# Patient Record
Sex: MALE | Race: WHITE | NOT HISPANIC OR LATINO | URBAN - METROPOLITAN AREA
[De-identification: names, ages, dates, MRNs, and addresses within clinical notes are randomized per-mention and may not be internally consistent; named-entity substitution may affect disease eponyms.]

---

## 2017-03-06 ENCOUNTER — AMBULATORY - HEALTHEAST (OUTPATIENT)
Dept: CARDIOLOGY | Facility: CLINIC | Age: 52
End: 2017-03-06

## 2017-03-06 DIAGNOSIS — I25.10 CORONARY ARTERY DISEASE: ICD-10-CM

## 2017-03-06 ASSESSMENT — MIFFLIN-ST. JEOR: SCORE: 1693.09

## 2017-03-14 ENCOUNTER — AMBULATORY - HEALTHEAST (OUTPATIENT)
Dept: CARDIOLOGY | Facility: CLINIC | Age: 52
End: 2017-03-14

## 2017-03-15 ENCOUNTER — COMMUNICATION - HEALTHEAST (OUTPATIENT)
Dept: CARDIOLOGY | Facility: CLINIC | Age: 52
End: 2017-03-15

## 2017-03-15 DIAGNOSIS — E78.5 DYSLIPIDEMIA: ICD-10-CM

## 2017-03-31 ENCOUNTER — AMBULATORY - HEALTHEAST (OUTPATIENT)
Dept: CARDIOLOGY | Facility: CLINIC | Age: 52
End: 2017-03-31

## 2017-05-15 ENCOUNTER — COMMUNICATION - HEALTHEAST (OUTPATIENT)
Dept: CARDIOLOGY | Facility: CLINIC | Age: 52
End: 2017-05-15

## 2017-05-22 ENCOUNTER — AMBULATORY - HEALTHEAST (OUTPATIENT)
Dept: CARDIOLOGY | Facility: CLINIC | Age: 52
End: 2017-05-22

## 2017-05-22 DIAGNOSIS — I25.10 CAD (CORONARY ARTERY DISEASE): ICD-10-CM

## 2017-06-02 ENCOUNTER — AMBULATORY - HEALTHEAST (OUTPATIENT)
Dept: CARDIOLOGY | Facility: CLINIC | Age: 52
End: 2017-06-02

## 2017-07-10 ENCOUNTER — COMMUNICATION - HEALTHEAST (OUTPATIENT)
Dept: CARDIOLOGY | Facility: CLINIC | Age: 52
End: 2017-07-10

## 2017-07-10 DIAGNOSIS — I25.10 CAD (CORONARY ARTERY DISEASE): ICD-10-CM

## 2017-08-14 ENCOUNTER — AMBULATORY - HEALTHEAST (OUTPATIENT)
Dept: CARDIOLOGY | Facility: CLINIC | Age: 52
End: 2017-08-14

## 2017-08-14 DIAGNOSIS — E78.5 DYSLIPIDEMIA: ICD-10-CM

## 2017-08-14 ASSESSMENT — MIFFLIN-ST. JEOR: SCORE: 1647.73

## 2017-08-17 ENCOUNTER — AMBULATORY - HEALTHEAST (OUTPATIENT)
Dept: CARDIOLOGY | Facility: CLINIC | Age: 52
End: 2017-08-17

## 2017-08-21 ENCOUNTER — AMBULATORY - HEALTHEAST (OUTPATIENT)
Dept: CARDIOLOGY | Facility: CLINIC | Age: 52
End: 2017-08-21

## 2017-10-22 ENCOUNTER — COMMUNICATION - HEALTHEAST (OUTPATIENT)
Dept: CARDIOLOGY | Facility: CLINIC | Age: 52
End: 2017-10-22

## 2017-10-22 DIAGNOSIS — E78.5 DYSLIPIDEMIA: ICD-10-CM

## 2017-10-22 DIAGNOSIS — I25.10 CAD (CORONARY ARTERY DISEASE): ICD-10-CM

## 2017-10-23 ENCOUNTER — COMMUNICATION - HEALTHEAST (OUTPATIENT)
Dept: CARDIOLOGY | Facility: CLINIC | Age: 52
End: 2017-10-23

## 2017-10-23 DIAGNOSIS — I25.10 CAD (CORONARY ARTERY DISEASE): ICD-10-CM

## 2017-10-23 DIAGNOSIS — E78.5 HYPERLIPIDEMIA: ICD-10-CM

## 2017-11-13 ENCOUNTER — AMBULATORY - HEALTHEAST (OUTPATIENT)
Dept: CARDIOLOGY | Facility: CLINIC | Age: 52
End: 2017-11-13

## 2017-11-13 DIAGNOSIS — I25.10 CORONARY ARTERY DISEASE DUE TO CALCIFIED CORONARY LESION: ICD-10-CM

## 2017-11-13 DIAGNOSIS — I25.84 CORONARY ARTERY DISEASE DUE TO CALCIFIED CORONARY LESION: ICD-10-CM

## 2017-12-05 ENCOUNTER — OFFICE VISIT - HEALTHEAST (OUTPATIENT)
Dept: CARDIOLOGY | Facility: CLINIC | Age: 52
End: 2017-12-05

## 2017-12-05 DIAGNOSIS — I10 ESSENTIAL HYPERTENSION: ICD-10-CM

## 2017-12-05 DIAGNOSIS — I25.10 CORONARY ARTERY DISEASE DUE TO CALCIFIED CORONARY LESION: ICD-10-CM

## 2017-12-05 DIAGNOSIS — E78.00 PURE HYPERCHOLESTEROLEMIA: ICD-10-CM

## 2017-12-05 DIAGNOSIS — Z95.1 S/P CABG (CORONARY ARTERY BYPASS GRAFT): ICD-10-CM

## 2017-12-05 DIAGNOSIS — I25.84 CORONARY ARTERY DISEASE DUE TO CALCIFIED CORONARY LESION: ICD-10-CM

## 2017-12-05 ASSESSMENT — MIFFLIN-ST. JEOR: SCORE: 1704.43

## 2017-12-26 ENCOUNTER — COMMUNICATION - HEALTHEAST (OUTPATIENT)
Dept: CARDIOLOGY | Facility: CLINIC | Age: 52
End: 2017-12-26

## 2017-12-26 DIAGNOSIS — E78.5 HYPERLIPIDEMIA: ICD-10-CM

## 2018-01-26 ENCOUNTER — COMMUNICATION - HEALTHEAST (OUTPATIENT)
Dept: CARDIOLOGY | Facility: CLINIC | Age: 53
End: 2018-01-26

## 2018-01-26 DIAGNOSIS — I25.10 CAD (CORONARY ARTERY DISEASE): ICD-10-CM

## 2018-02-05 ENCOUNTER — AMBULATORY - HEALTHEAST (OUTPATIENT)
Dept: CARDIOLOGY | Facility: CLINIC | Age: 53
End: 2018-02-05

## 2018-02-05 DIAGNOSIS — I25.84 CORONARY ARTERY DISEASE DUE TO CALCIFIED CORONARY LESION: ICD-10-CM

## 2018-02-05 DIAGNOSIS — I25.10 CORONARY ARTERY DISEASE DUE TO CALCIFIED CORONARY LESION: ICD-10-CM

## 2018-02-05 ASSESSMENT — MIFFLIN-ST. JEOR: SCORE: 1727.11

## 2018-02-08 ENCOUNTER — AMBULATORY - HEALTHEAST (OUTPATIENT)
Dept: CARDIOLOGY | Facility: CLINIC | Age: 53
End: 2018-02-08

## 2018-02-14 ENCOUNTER — COMMUNICATION - HEALTHEAST (OUTPATIENT)
Dept: CARDIOLOGY | Facility: CLINIC | Age: 53
End: 2018-02-14

## 2018-04-23 ENCOUNTER — AMBULATORY - HEALTHEAST (OUTPATIENT)
Dept: CARDIOLOGY | Facility: CLINIC | Age: 53
End: 2018-04-23

## 2018-04-23 DIAGNOSIS — I25.84 CORONARY ARTERY DISEASE DUE TO CALCIFIED CORONARY LESION: ICD-10-CM

## 2018-04-23 DIAGNOSIS — I25.10 CORONARY ARTERY DISEASE DUE TO CALCIFIED CORONARY LESION: ICD-10-CM

## 2018-04-25 ENCOUNTER — COMMUNICATION - HEALTHEAST (OUTPATIENT)
Dept: CARDIOLOGY | Facility: CLINIC | Age: 53
End: 2018-04-25

## 2018-04-25 DIAGNOSIS — I25.10 CAD (CORONARY ARTERY DISEASE): ICD-10-CM

## 2018-07-17 ENCOUNTER — AMBULATORY - HEALTHEAST (OUTPATIENT)
Dept: CARDIOLOGY | Facility: CLINIC | Age: 53
End: 2018-07-17

## 2018-07-17 DIAGNOSIS — I25.10 CAD (CORONARY ARTERY DISEASE): ICD-10-CM

## 2018-07-17 ASSESSMENT — MIFFLIN-ST. JEOR: SCORE: 1790.61

## 2018-07-27 ENCOUNTER — COMMUNICATION - HEALTHEAST (OUTPATIENT)
Dept: CARDIOLOGY | Facility: CLINIC | Age: 53
End: 2018-07-27

## 2018-07-27 DIAGNOSIS — I25.10 CAD (CORONARY ARTERY DISEASE): ICD-10-CM

## 2018-08-01 ENCOUNTER — AMBULATORY - HEALTHEAST (OUTPATIENT)
Dept: CARDIOLOGY | Facility: CLINIC | Age: 53
End: 2018-08-01

## 2018-10-09 ENCOUNTER — AMBULATORY - HEALTHEAST (OUTPATIENT)
Dept: CARDIOLOGY | Facility: CLINIC | Age: 53
End: 2018-10-09

## 2018-10-09 DIAGNOSIS — I25.10 CAD (CORONARY ARTERY DISEASE): ICD-10-CM

## 2018-10-11 ENCOUNTER — COMMUNICATION - HEALTHEAST (OUTPATIENT)
Dept: CARDIOLOGY | Facility: CLINIC | Age: 53
End: 2018-10-11

## 2018-10-11 DIAGNOSIS — E78.5 HYPERLIPIDEMIA: ICD-10-CM

## 2019-01-08 ENCOUNTER — AMBULATORY - HEALTHEAST (OUTPATIENT)
Dept: CARDIOLOGY | Facility: CLINIC | Age: 54
End: 2019-01-08

## 2019-01-08 DIAGNOSIS — I25.10 CAD (CORONARY ARTERY DISEASE): ICD-10-CM

## 2019-01-08 ASSESSMENT — MIFFLIN-ST. JEOR: SCORE: 1727.11

## 2019-01-15 ENCOUNTER — AMBULATORY - HEALTHEAST (OUTPATIENT)
Dept: CARDIOLOGY | Facility: CLINIC | Age: 54
End: 2019-01-15

## 2019-02-10 ENCOUNTER — COMMUNICATION - HEALTHEAST (OUTPATIENT)
Dept: CARDIOLOGY | Facility: CLINIC | Age: 54
End: 2019-02-10

## 2019-02-10 DIAGNOSIS — I25.10 CAD (CORONARY ARTERY DISEASE): ICD-10-CM

## 2019-03-06 ENCOUNTER — OFFICE VISIT - HEALTHEAST (OUTPATIENT)
Dept: CARDIOLOGY | Facility: CLINIC | Age: 54
End: 2019-03-06

## 2019-03-06 DIAGNOSIS — E78.5 HYPERLIPIDEMIA: ICD-10-CM

## 2019-03-06 DIAGNOSIS — Z95.1 S/P CABG (CORONARY ARTERY BYPASS GRAFT): ICD-10-CM

## 2019-03-06 DIAGNOSIS — E78.00 PURE HYPERCHOLESTEROLEMIA: ICD-10-CM

## 2019-03-06 DIAGNOSIS — I25.10 CAD (CORONARY ARTERY DISEASE): ICD-10-CM

## 2019-03-06 DIAGNOSIS — I10 ESSENTIAL HYPERTENSION: ICD-10-CM

## 2019-03-06 RX ORDER — NITROGLYCERIN 0.4 MG/1
0.4 TABLET SUBLINGUAL EVERY 5 MIN PRN
Qty: 20 TABLET | Refills: 4 | Status: SHIPPED | OUTPATIENT
Start: 2019-03-06

## 2019-03-06 ASSESSMENT — MIFFLIN-ST. JEOR: SCORE: 1713.5

## 2019-04-02 ENCOUNTER — AMBULATORY - HEALTHEAST (OUTPATIENT)
Dept: CARDIOLOGY | Facility: CLINIC | Age: 54
End: 2019-04-02

## 2019-04-02 DIAGNOSIS — I25.10 CAD (CORONARY ARTERY DISEASE): ICD-10-CM

## 2019-06-25 ENCOUNTER — AMBULATORY - HEALTHEAST (OUTPATIENT)
Dept: CARDIOLOGY | Facility: CLINIC | Age: 54
End: 2019-06-25

## 2019-06-25 DIAGNOSIS — I25.10 CAD (CORONARY ARTERY DISEASE): ICD-10-CM

## 2019-06-25 ASSESSMENT — MIFFLIN-ST. JEOR: SCORE: 1681.75

## 2019-07-01 ENCOUNTER — AMBULATORY - HEALTHEAST (OUTPATIENT)
Dept: CARDIOLOGY | Facility: CLINIC | Age: 54
End: 2019-07-01

## 2019-09-17 ENCOUNTER — AMBULATORY - HEALTHEAST (OUTPATIENT)
Dept: CARDIOLOGY | Facility: CLINIC | Age: 54
End: 2019-09-17

## 2019-09-17 DIAGNOSIS — I25.10 CAD (CORONARY ARTERY DISEASE): ICD-10-CM

## 2019-11-02 ENCOUNTER — COMMUNICATION - HEALTHEAST (OUTPATIENT)
Dept: CARDIOLOGY | Facility: CLINIC | Age: 54
End: 2019-11-02

## 2019-11-02 DIAGNOSIS — E78.5 HYPERLIPIDEMIA: ICD-10-CM

## 2019-12-10 ENCOUNTER — AMBULATORY - HEALTHEAST (OUTPATIENT)
Dept: CARDIOLOGY | Facility: CLINIC | Age: 54
End: 2019-12-10

## 2019-12-10 DIAGNOSIS — E78.5 HYPERLIPIDEMIA LDL GOAL <70: ICD-10-CM

## 2019-12-10 ASSESSMENT — MIFFLIN-ST. JEOR: SCORE: 1686.29

## 2019-12-20 ENCOUNTER — AMBULATORY - HEALTHEAST (OUTPATIENT)
Dept: CARDIOLOGY | Facility: CLINIC | Age: 54
End: 2019-12-20

## 2020-03-03 ENCOUNTER — AMBULATORY - HEALTHEAST (OUTPATIENT)
Dept: CARDIOLOGY | Facility: CLINIC | Age: 55
End: 2020-03-03

## 2020-03-03 DIAGNOSIS — E78.5 HYPERLIPIDEMIA LDL GOAL <70: ICD-10-CM

## 2020-03-03 DIAGNOSIS — Z00.6 CLINICAL TRIAL PARTICIPANT: ICD-10-CM

## 2020-03-13 ENCOUNTER — COMMUNICATION - HEALTHEAST (OUTPATIENT)
Dept: CARDIOLOGY | Facility: CLINIC | Age: 55
End: 2020-03-13

## 2020-03-13 DIAGNOSIS — I25.10 CAD (CORONARY ARTERY DISEASE): ICD-10-CM

## 2020-04-09 ENCOUNTER — COMMUNICATION - HEALTHEAST (OUTPATIENT)
Dept: CARDIOLOGY | Facility: CLINIC | Age: 55
End: 2020-04-09

## 2020-04-09 DIAGNOSIS — I25.10 CAD (CORONARY ARTERY DISEASE): ICD-10-CM

## 2020-04-23 ENCOUNTER — OFFICE VISIT - HEALTHEAST (OUTPATIENT)
Dept: CARDIOLOGY | Facility: CLINIC | Age: 55
End: 2020-04-23

## 2020-04-23 DIAGNOSIS — I25.84 CORONARY ARTERY DISEASE DUE TO CALCIFIED CORONARY LESION: ICD-10-CM

## 2020-04-23 DIAGNOSIS — E78.5 DYSLIPIDEMIA, GOAL LDL BELOW 70: ICD-10-CM

## 2020-04-23 DIAGNOSIS — I10 ESSENTIAL HYPERTENSION: ICD-10-CM

## 2020-04-23 DIAGNOSIS — Z95.1 S/P CABG (CORONARY ARTERY BYPASS GRAFT): ICD-10-CM

## 2020-04-23 DIAGNOSIS — E78.00 PURE HYPERCHOLESTEROLEMIA: ICD-10-CM

## 2020-04-23 DIAGNOSIS — I25.10 CORONARY ARTERY DISEASE DUE TO CALCIFIED CORONARY LESION: ICD-10-CM

## 2020-05-26 ENCOUNTER — COMMUNICATION - HEALTHEAST (OUTPATIENT)
Dept: CARDIOLOGY | Facility: CLINIC | Age: 55
End: 2020-05-26

## 2020-05-27 ENCOUNTER — OFFICE VISIT - HEALTHEAST (OUTPATIENT)
Dept: CARDIOLOGY | Facility: CLINIC | Age: 55
End: 2020-05-27

## 2020-05-27 ENCOUNTER — AMBULATORY - HEALTHEAST (OUTPATIENT)
Dept: CARDIOLOGY | Facility: CLINIC | Age: 55
End: 2020-05-27

## 2020-05-27 DIAGNOSIS — I25.10 CAD (CORONARY ARTERY DISEASE): ICD-10-CM

## 2020-06-11 ENCOUNTER — COMMUNICATION - HEALTHEAST (OUTPATIENT)
Dept: CARDIOLOGY | Facility: CLINIC | Age: 55
End: 2020-06-11

## 2020-06-11 DIAGNOSIS — I25.10 CAD (CORONARY ARTERY DISEASE): ICD-10-CM

## 2020-07-11 ENCOUNTER — COMMUNICATION - HEALTHEAST (OUTPATIENT)
Dept: CARDIOLOGY | Facility: CLINIC | Age: 55
End: 2020-07-11

## 2020-07-11 DIAGNOSIS — I25.10 CAD (CORONARY ARTERY DISEASE): ICD-10-CM

## 2020-08-16 ENCOUNTER — COMMUNICATION - HEALTHEAST (OUTPATIENT)
Dept: CARDIOLOGY | Facility: CLINIC | Age: 55
End: 2020-08-16

## 2020-08-16 DIAGNOSIS — I25.10 CAD (CORONARY ARTERY DISEASE): ICD-10-CM

## 2020-08-18 ENCOUNTER — OFFICE VISIT - HEALTHEAST (OUTPATIENT)
Dept: CARDIOLOGY | Facility: CLINIC | Age: 55
End: 2020-08-18

## 2020-08-18 DIAGNOSIS — I25.10 CAD (CORONARY ARTERY DISEASE): ICD-10-CM

## 2020-09-09 ENCOUNTER — COMMUNICATION - HEALTHEAST (OUTPATIENT)
Dept: CARDIOLOGY | Facility: CLINIC | Age: 55
End: 2020-09-09

## 2020-09-09 DIAGNOSIS — E78.5 HYPERLIPIDEMIA: ICD-10-CM

## 2020-11-09 ENCOUNTER — COMMUNICATION - HEALTHEAST (OUTPATIENT)
Dept: CARDIOLOGY | Facility: CLINIC | Age: 55
End: 2020-11-09

## 2020-11-10 ENCOUNTER — AMBULATORY - HEALTHEAST (OUTPATIENT)
Dept: CARDIOLOGY | Facility: CLINIC | Age: 55
End: 2020-11-10

## 2020-11-10 DIAGNOSIS — I25.10 CAD (CORONARY ARTERY DISEASE): ICD-10-CM

## 2020-11-10 ASSESSMENT — MIFFLIN-ST. JEOR: SCORE: 1676.76

## 2020-12-02 ENCOUNTER — AMBULATORY - HEALTHEAST (OUTPATIENT)
Dept: CARDIOLOGY | Facility: CLINIC | Age: 55
End: 2020-12-02

## 2020-12-19 ENCOUNTER — COMMUNICATION - HEALTHEAST (OUTPATIENT)
Dept: CARDIOLOGY | Facility: CLINIC | Age: 55
End: 2020-12-19

## 2020-12-19 DIAGNOSIS — I25.10 CAD (CORONARY ARTERY DISEASE): ICD-10-CM

## 2021-02-01 ENCOUNTER — COMMUNICATION - HEALTHEAST (OUTPATIENT)
Dept: CARDIOLOGY | Facility: CLINIC | Age: 56
End: 2021-02-01

## 2021-02-02 ENCOUNTER — AMBULATORY - HEALTHEAST (OUTPATIENT)
Dept: CARDIOLOGY | Facility: CLINIC | Age: 56
End: 2021-02-02

## 2021-02-02 DIAGNOSIS — I25.10 CAD (CORONARY ARTERY DISEASE): ICD-10-CM

## 2021-04-05 ENCOUNTER — COMMUNICATION - HEALTHEAST (OUTPATIENT)
Dept: CARDIOLOGY | Facility: CLINIC | Age: 56
End: 2021-04-05

## 2021-04-05 DIAGNOSIS — I25.10 CAD (CORONARY ARTERY DISEASE): ICD-10-CM

## 2021-04-05 RX ORDER — CLOPIDOGREL BISULFATE 75 MG/1
TABLET ORAL
Qty: 90 TABLET | Refills: 0 | Status: SHIPPED | OUTPATIENT
Start: 2021-04-05 | End: 2021-07-28

## 2021-04-07 ENCOUNTER — COMMUNICATION - HEALTHEAST (OUTPATIENT)
Dept: ADMINISTRATIVE | Facility: CLINIC | Age: 56
End: 2021-04-07

## 2021-04-27 ENCOUNTER — COMMUNICATION - HEALTHEAST (OUTPATIENT)
Dept: CARDIOLOGY | Facility: CLINIC | Age: 56
End: 2021-04-27

## 2021-04-28 ENCOUNTER — AMBULATORY - HEALTHEAST (OUTPATIENT)
Dept: CARDIOLOGY | Facility: CLINIC | Age: 56
End: 2021-04-28

## 2021-04-28 DIAGNOSIS — I25.10 CAD (CORONARY ARTERY DISEASE): ICD-10-CM

## 2021-05-29 NOTE — PATIENT INSTRUCTIONS - HE
Thank you for meeting with the research team today regarding the Fourier OLE study.  We will see you in about 3 months back at [facility] to resupply you with study drug.   If you have any questions in the meantime, please contact the research team.    Research Hotline: [phone]    Next drug resupply only visit: Tuesday Sept 10, at 730AM    Next in clinic FASTING labs visit: Tuesday Dec 3, at 730AM    Dinesh Lira RN  Clinical Trials Nurse

## 2021-05-30 VITALS — WEIGHT: 197 LBS | BODY MASS INDEX: 29.86 KG/M2 | HEIGHT: 68 IN

## 2021-05-31 VITALS — HEIGHT: 68 IN | WEIGHT: 187 LBS | BODY MASS INDEX: 28.34 KG/M2

## 2021-05-31 VITALS — BODY MASS INDEX: 29.03 KG/M2 | WEIGHT: 196 LBS | HEIGHT: 69 IN

## 2021-06-01 ENCOUNTER — RECORDS - HEALTHEAST (OUTPATIENT)
Dept: ADMINISTRATIVE | Facility: CLINIC | Age: 56
End: 2021-06-01

## 2021-06-01 VITALS — HEIGHT: 69 IN | WEIGHT: 201 LBS | BODY MASS INDEX: 29.77 KG/M2

## 2021-06-01 VITALS — HEIGHT: 69 IN | WEIGHT: 215 LBS | BODY MASS INDEX: 31.84 KG/M2

## 2021-06-02 ENCOUNTER — OFFICE VISIT - HEALTHEAST (OUTPATIENT)
Dept: CARDIOLOGY | Facility: CLINIC | Age: 56
End: 2021-06-02

## 2021-06-02 VITALS — WEIGHT: 198 LBS | BODY MASS INDEX: 29.33 KG/M2 | HEIGHT: 69 IN

## 2021-06-02 VITALS — HEIGHT: 69 IN | BODY MASS INDEX: 29.77 KG/M2 | WEIGHT: 201 LBS

## 2021-06-02 DIAGNOSIS — I10 ESSENTIAL HYPERTENSION: ICD-10-CM

## 2021-06-02 DIAGNOSIS — I25.10 CORONARY ARTERY DISEASE DUE TO CALCIFIED CORONARY LESION: ICD-10-CM

## 2021-06-02 DIAGNOSIS — E78.00 PURE HYPERCHOLESTEROLEMIA: ICD-10-CM

## 2021-06-02 DIAGNOSIS — Z95.1 S/P CABG (CORONARY ARTERY BYPASS GRAFT): ICD-10-CM

## 2021-06-02 DIAGNOSIS — I25.84 CORONARY ARTERY DISEASE DUE TO CALCIFIED CORONARY LESION: ICD-10-CM

## 2021-06-02 DIAGNOSIS — Z00.6 EXAMINATION OF PARTICIPANT IN CLINICAL TRIAL: ICD-10-CM

## 2021-06-03 VITALS — WEIGHT: 191 LBS | HEIGHT: 69 IN | BODY MASS INDEX: 28.29 KG/M2

## 2021-06-04 VITALS
HEART RATE: 54 BPM | BODY MASS INDEX: 27.32 KG/M2 | WEIGHT: 185 LBS | DIASTOLIC BLOOD PRESSURE: 76 MMHG | SYSTOLIC BLOOD PRESSURE: 140 MMHG

## 2021-06-04 VITALS
HEART RATE: 56 BPM | WEIGHT: 192 LBS | DIASTOLIC BLOOD PRESSURE: 72 MMHG | HEIGHT: 69 IN | SYSTOLIC BLOOD PRESSURE: 120 MMHG | BODY MASS INDEX: 28.44 KG/M2 | RESPIRATION RATE: 14 BRPM

## 2021-06-04 NOTE — PATIENT INSTRUCTIONS - HE
Thank you for meeting with the research team today regarding the Fourier OLE study.  We will see you in about 3 months back at [facility] to resupply you with study drug.   If you have any questions in the meantime, please contact the research team.    Research Hotline: [phone]    Next drug resupply only visit: Tuesday 3/3/20 at 7:30am in the [facility] in clinic FASTING labs visit:  Wednesday 5/27/20 at 7:30am  12/23  1/6  1/20  2/3  2/17  3/2    3/16  3/30  4/13  4/27  5/11  5/25    Dinesh Lira RN  Clinical Trials Nurse

## 2021-06-05 VITALS
HEIGHT: 69 IN | SYSTOLIC BLOOD PRESSURE: 120 MMHG | RESPIRATION RATE: 20 BRPM | BODY MASS INDEX: 28.13 KG/M2 | WEIGHT: 189.9 LBS | DIASTOLIC BLOOD PRESSURE: 60 MMHG | HEART RATE: 56 BPM

## 2021-06-06 NOTE — PATIENT INSTRUCTIONS - HE
Thank you for coming in today.  I will ask Dinesh or Mona to get in touch with you for the next study visit. This visit will be a fasting visit and is expected to be around May 21, 2020.  Have a wonderful spring.    Best regards,  Jenna Matamoros RN, BSN  Clinical Trials Nurse  [phone]

## 2021-06-07 NOTE — PATIENT INSTRUCTIONS - HE
Mr Espinoza,  Certainly was nice to video chat with you today.  I am glad to hear you are doing well.  As discussed, we will arrange for a stress test once virus issues settle down.  In the interim, call me with any issues otherwise I will see you in a year or sooner if needed.  Sony Irizarry

## 2021-06-08 ENCOUNTER — RECORDS - HEALTHEAST (OUTPATIENT)
Dept: ADMINISTRATIVE | Facility: OTHER | Age: 56
End: 2021-06-08

## 2021-06-08 DIAGNOSIS — E78.5 HYPERLIPIDEMIA: ICD-10-CM

## 2021-06-08 DIAGNOSIS — I25.10 CAD (CORONARY ARTERY DISEASE): ICD-10-CM

## 2021-06-08 RX ORDER — AMLODIPINE BESYLATE 2.5 MG/1
TABLET ORAL
Qty: 90 TABLET | Refills: 1 | Status: SHIPPED | OUTPATIENT
Start: 2021-06-08 | End: 2022-01-25

## 2021-06-08 RX ORDER — ATORVASTATIN CALCIUM 10 MG/1
TABLET, FILM COATED ORAL
Qty: 45 TABLET | Refills: 1 | Status: SHIPPED | OUTPATIENT
Start: 2021-06-08 | End: 2021-08-18

## 2021-06-09 ENCOUNTER — RECORDS - HEALTHEAST (OUTPATIENT)
Dept: ADMINISTRATIVE | Facility: CLINIC | Age: 56
End: 2021-06-09

## 2021-06-09 NOTE — PROGRESS NOTES
"FOURIER OLE study    Subject seen in clinic for Week 24 study visit.    Visit Vitals     /60 (Patient Site: Right Arm, Patient Position: Sitting, Cuff Size: Adult Large)     Pulse (!) 56     Temp 97.7  F (36.5  C) (Oral)     Resp 16     Ht 5' 8\" (1.727 m)     Wt 197 lb (89.4 kg)     BMI 29.95 kg/m2     Labs drawn per protocol.     Subject returned Used pens & boxes   Used pens #3  [id number]   Used pens #2   [id number]  Administrations This Visit     Study Drug evolocumab 140 mg/mL injector pen 1 Syringe ()     Admin Date Action Dose Route Administered By             03/06/2017 Given 1 Syringe Subcutaneous Dinesh Lira RN                      Subject self injected 1 pen of study drug  box [id number] at 09:20am to right thigh. Pen out of refrigerator at 08:50am.    Sent home with new IP   1 box 2 pens of box [id number]    1 box 3 pens of box [id number] (addended [id number])    Subject reports no adverse events,endpoints, or significant changes in health.  Lipid lab values are now unblinded and will be listed in 'media'.  Will see subject back in 6 months for week 48 visit.       Current Outpatient Prescriptions:      amLODIPine (NORVASC) 2.5 MG tablet, TAKE ONE TABLET BY MOUTH AT BEDTIME, Disp: 90 tablet, Rfl: 2     aspirin 81 MG EC tablet, Take 81 mg by mouth every evening. , Disp: , Rfl:      atorvastatin (LIPITOR) 20 MG tablet, Take 1 tablet (20 mg total) by mouth bedtime., Disp: 90 tablet, Rfl: 3     clopidogrel (PLAVIX) 75 mg tablet, TAKE ONE TABLET BY MOUTH ONE TIME DAILY, Disp: 90 tablet, Rfl: 1     diazepam (VALIUM) 2 MG tablet, Take 2 mg by mouth daily as needed for anxiety. , Disp: , Rfl:      isosorbide mononitrate (IMDUR) 30 MG 24 hr tablet, TAKE ONE TABLET BY MOUTH ONE TIME DAILY, Disp: 90 tablet, Rfl: 2     metoprolol succinate (TOPROL-XL) 25 MG, TAKE ONE TABLET BY MOUTH AT BEDTIME, Disp: 90 tablet, Rfl: 2     Study Drug evolocumab 140 mg/mL (), Inject 1 Syringe under the skin " every 14 (fourteen) days., Disp: , Rfl:     Current Facility-Administered Medications:      Study Drug evolocumab 140 mg/mL injector pen 1 Syringe (), 1 Syringe, Subcutaneous, Q14 Days, Rita Irizarry MD, 1 Syringe at 12/12/16 0818     Study Drug evolocumab 140 mg/mL injector pen 1 Syringe (), 1 Syringe, Subcutaneous, Q14 Days, Rita Irizarry MD, 1 Syringe at 03/06/17 0920     Study Drug Evolocumab 140 mg/Placebo Autoinjector pen <FOURIER> 1 pen, 1 pen, Subcutaneous, Q14 Days, Jenna Matamoros RN, 1 pen at 09/15/16 0925      Dinesh Lira RN  Clinical Trials Nurse

## 2021-06-10 NOTE — PROGRESS NOTES
FOURIER-OLE STUDY:  A Multicenter, Open-label, Single-arm, Extension Study to Assess  Long-term Safety of Evolocumab Therapy in Patients With Clinically Evident  Cardiovascular Disease    Subject seen for Week 36 Study Drug Resupply    There were no assessments or other activities performed as this visit was only to return study drug pens & boxes and resupply.  Will see subject in 3 months for next study visit in clinic.     Subject returned Used pens & boxes   Used pens #2 [id number]   Used pens #3 [id number]    Sent home with new IP   1 box 3 pens of box [id number]   1 box 3 pens of box [id number]     Dinesh Lira RN  Clinical Trials Nurse

## 2021-06-10 NOTE — TELEPHONE ENCOUNTER
COVID-19 Wellness Screening Tool   LVM with wellness information and reminder for tomorrow's resupply.  Dinesh Lira RN

## 2021-06-11 NOTE — PROGRESS NOTES
FOURIER OLE study    Monday 5/15/2017 (previous phone contact with patient)  Called patient to discuss Fourier OLE study visits and his ER visit yesterday.  Patient reported gradual increasing abdominal pain yesterday that caused him to go into [facility] ER.  Physician reported assessment and CT scan at  didn't show any acute processes - Hepatic steatosis, Sigmoid diverticulosis.  His pain eventually subsided without treatment and he was discharged without issue. Final Impression: Abdominal pain and Nausea     Dinesh Lira RN  Clinical Trials Nurse    Adverse Event: Nausea (abdominal pain event was previously reported and documented)  Serious: No  Severity CTCAE  1   Start Date: 14May2017  End Date: 14May2017  Dr. Irizarry: Please assign causality of above AE  Relationship: Is there a reasonable possibility that the event may have been caused by Investigational Medicinal Product? NO  Relationship: Is there a reasonable possibility that the event may have been caused by a statin? NO  Relationship: Is there a reasonable possibility that the event may have been caused by a non-statin lipid regulating medication? NO  Relationship: Is there a reasonable possibility that the event may have been caused by the investigational device? NO

## 2021-06-12 NOTE — TELEPHONE ENCOUNTER
Spoke with Aníbal about the study plan for tomorrow and conducting covid19 prescreening information.  He relayed he can't cover nose and mouth at same time which we will work around.  Dinesh Lira RN

## 2021-06-13 NOTE — PATIENT INSTRUCTIONS - HE
Thank you for meeting with the research team today regarding the Fourier OLE study.  We will see you in about 3 months to resupply you with study drug.   If you have any questions in the meantime, please contact the research team.    Research Hotline: [phone]    Dinesh Lira RN  Clinical Trials Nurse

## 2021-06-14 NOTE — TELEPHONE ENCOUNTER
Spoke with subject for study visit drug resupply reminder - denied covid19 symptoms, exposure, testing.  Discussed covid19 prescreen and study plan for tomorrow at [facility].    Dinesh Lira RN

## 2021-06-14 NOTE — PROGRESS NOTES
[facility] Follow-up Note    Assessment & Plan        1. Coronary artery disease due to calcified coronary lesion  -angiography January 2013 showed 20% mid left main lesion, proximal total occlusion of the left anterior descending, circumflex with a 60% mid lesion followed by a total occlusion, and the right coronary artery with a mid 60% followed by distal 70% lesion.  He received drug-coated stents to the right coronary artery.  We'll keep him on chronic Plavix therapy.  He can discontinue his aspirin.     2. S/P CABG (coronary artery bypass graft)  -patient had bypass in July 2009 with a LIMA to the LAD, vein graft and OM 3, and both of these are patent.    Given progression of disease despite bypass he is on chronic Plavix therapy.   3. Essential hypertension -under good control on amlodipine which is also antispasmodic.   4. Pure hypercholesterolemia -cholesterol 63 with an LDL of 13, given this we will decrease Lipitor to 5 mg on his next renewal.  He is in the open label phase of the [employer] study.     Plan  1.  When he calls and runs out of his atorvastatin 20 mg were renewed 10 mg tablets.  He will then cut these in half or 5 mg.  2.  Follow-up with me in about 18 months or sooner if needed.  3.  Try to wean and taper Imdur.    Subjective  CC: 52-year-old white gentleman being seen in yearly follow-up today.  Since I seen him he was hospitalized at [facility] secondary to some chest discomfort lightheadedness.  This was over the summer.  He is getting along well in the interim without any chest discomfort, palpitations, shortness breath, PND, orthopnea or peripheral edema.    Medications  Current Outpatient Prescriptions   Medication Sig     amLODIPine (NORVASC) 2.5 MG tablet Take 1 tablet (2.5 mg total) by mouth at bedtime.     atorvastatin (LIPITOR) 20 MG tablet Take 0.5 tablets (10 mg total) by mouth at bedtime.     clopidogrel (PLAVIX) 75 mg tablet Take 1 tablet (75 mg total) by  "mouth daily.     diazePAM (VALIUM) 2 MG tablet Take 1 tablet (2 mg total) by mouth daily as needed for anxiety.     isosorbide mononitrate (IMDUR) 30 MG 24 hr tablet Take 30 mg by mouth daily.        Objective  /78 (Patient Site: Left Arm, Patient Position: Sitting, Cuff Size: Adult Regular)  Pulse (!) 52  Resp 16  Ht 5' 9\" (1.753 m)  Wt 196 lb (88.9 kg)  BMI 28.94 kg/m2    General Appearance:    Alert, cooperative, no distress, appears stated age   Head:    Normocephalic, without obvious abnormality, atraumatic   Throat:   Lips, mucosa, and tongue normal; teeth and gums normal   Neck:   Supple, symmetrical, trachea midline, no adenopathy;        thyroid:  No enlargement/tenderness/nodules; no carotid    bruit or JVD   Back:     Symmetric, no curvature, ROM normal, no CVA tenderness   Lungs:     Clear to auscultation bilaterally, respirations unlabored   Chest wall:    No tenderness, midline sternotomy scar   Heart:    Regular rate and rhythm, S1 and S2 normal, no murmur, rub   or gallop   Abdomen:     Soft, non-tender, bowel sounds active all four quadrants,     no masses, no organomegaly   Extremities:   Normal, atraumatic, no cyanosis or edema   Pulses:   2+ and symmetric all extremities   Skin:   Skin color, texture, turgor normal, no rashes or lesions     Results    Lab Results personally reviewed   Lab Results   Component Value Date    CHOL 144 01/17/2014    CHOL 167 07/08/2009     Lab Results   Component Value Date    HDL 29 (L) 01/17/2014    HDL 24 (L) 07/08/2009     Lab Results   Component Value Date    LDLCALC 95 01/17/2014    LDLCALC 108 07/08/2009     Lab Results   Component Value Date    TRIG 105 01/17/2014    TRIG 175 (H) 07/08/2009     Lab Results   Component Value Date    WBC 8.1 08/18/2017    HGB 16.1 08/18/2017    HCT 43.7 08/18/2017     08/18/2017     Lab Results   Component Value Date    CREATININE 0.92 08/18/2017    BUN 14 08/18/2017     08/18/2017    K 3.8 08/18/2017    " CO2 21 (L) 08/18/2017     Review of Systems:   General: WNL  Eyes: WNL  Ears/Nose/Throat: WNL  Lungs: WNL  Heart: WNL  Stomach: WNL  Bladder: WNL  Muscle/Joints: WNL  Skin: WNL  Nervous System: WNL  Mental Health: WNL     Blood: WNL

## 2021-06-16 PROBLEM — E78.00 PURE HYPERCHOLESTEROLEMIA: Status: ACTIVE | Noted: 2017-12-05

## 2021-06-21 NOTE — LETTER
Letter by Rita Irizarry MD at      Author: Rita Irizarry MD Service: -- Author Type: --    Filed:  Encounter Date: 4/7/2021 Status: (Other)         Aníbal Espinoza  [address] [address]      April 7, 2021      Dear Aníbal,    This letter is to remind you that you will be due for your follow up appointment with Dr. Sony Irizarry in April, 2021 . To help ensure you are in the best health possible, a regular follow-up with your cardiologist is essential.     Please call our Patient Scheduling Line at [phone] to schedule your appointment at your earliest convenience.  If you have recently scheduled an appointment, please disregard this letter.    We look forward to seeing you again. As always, we are available at the number  above for any questions or concerns you may have.      Sincerely,     The Physicians and Staff of [facility]

## 2021-06-22 NOTE — PATIENT INSTRUCTIONS - HE
Thank you for meeting with the research team today regarding the Fourier OLE study.  We will see you in about 3 months back at [facility] to resupply you with study drug.   If you have any questions in the meantime, please contact the research team.      Tuesday April 2nd at 7:30AM for a drug resupply in the [facility] area  Tuesday June 25th at 7:30AM for a fasting     Research Hotline: [phone]    Dinesh Lira RN  Clinical Trials Nurse

## 2021-06-24 NOTE — PATIENT INSTRUCTIONS - HE
Mr Aníbal Espinoza,  I enjoyed visiting with you again today.  I am glad to hear you are doing well.  Per our conversation all the medications were renewed.  I will plan on seeing you 1 year or sooner if needed.  Sony Irizarry

## 2021-06-24 NOTE — PROGRESS NOTES
[facility] [place] [facility] Follow-up Note    Assessment & Plan        1. S/P CABG (coronary artery bypass graft)  -patient had bypass in July 2009 with a LIMA to the LAD, vein graft and OM 3, and both of these are patent.    Given progression of disease despite bypass he is on chronic Plavix therapy.   2. Hyperlipidemia -on Repatha as part of the open label extension in the study, as well as atorvastatin 5 mg every other day.  Total cholesterol 79 with an LDL of 25 and per study protocol continue current medications.   3. CAD (coronary artery disease) -angiography January 2013 showed 20% mid left main lesion, proximal total occlusion of the LAD, circumflex with a 60% mid lesion followed by total occlusion, and right coronary artery with a mid 60% followed by distal 70% lesion and he received drug-coated stents to the right coronary artery.  As above chronic Plavix therapy, preventive therapy, and he is asymptomatic.   4. Essential hypertension -under good control currently.     Plan  1.  Renewed medications today.  2.  Follow-up with me in 1 year or sooner if needed.    Subjective  CC: 53-year-old white gentleman here for yearly follow-up today.  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  He does get an occasional discomfort in the middle of his back.    Medications  Current Outpatient Medications   Medication Sig     amLODIPine (NORVASC) 2.5 MG tablet Take 1 tablet (2.5 mg total) by mouth daily.     atorvastatin (LIPITOR) 10 MG tablet Take 0.5 tablets (5 mg total) by mouth every other day.     clopidogrel (PLAVIX) 75 mg tablet Take 1 tablet (75 mg total) by mouth daily.     nitroglycerin (NITROSTAT) 0.4 MG SL tablet Place 1 tablet (0.4 mg total) under the tongue every 5 (five) minutes as needed for chest pain.       Objective  /62 (Patient Site: Left Arm, Patient Position: Sitting, Cuff Size:  "Adult Large)   Pulse (!) 46   Resp 16   Ht 5' 9\" (1.753 m)   Wt 198 lb (89.8 kg)   BMI 29.24 kg/m      General Appearance:    Alert, cooperative, no distress, appears stated age   Head:    Normocephalic, without obvious abnormality, atraumatic   Throat:   Lips, mucosa, and tongue normal; teeth and gums normal   Neck:   Supple, symmetrical, trachea midline, no adenopathy;        thyroid:  No enlargement/tenderness/nodules; no carotid    bruit or JVD   Back:     Symmetric, no curvature, ROM normal, no CVA tenderness   Lungs:     Clear to auscultation bilaterally, respirations unlabored   Chest wall:    No tenderness, midline sternotomy scar   Heart:    Regular rate and rhythm, S1 and S2 normal, no murmur, rub   or gallop   Abdomen:     Soft, non-tender, bowel sounds active all four quadrants,     no masses, no organomegaly   Extremities:   Normal, atraumatic, no cyanosis or edema   Pulses:   2+ and symmetric all extremities   Skin:   Skin color, texture, turgor normal, no rashes or lesions     Results    Lab Results personally reviewed   Lab Results   Component Value Date    CHOL 144 01/17/2014    CHOL 167 07/08/2009     Lab Results   Component Value Date    HDL 29 (L) 01/17/2014    HDL 24 (L) 07/08/2009     Lab Results   Component Value Date    LDLCALC 95 01/17/2014    LDLCALC 108 07/08/2009     Lab Results   Component Value Date    TRIG 105 01/17/2014    TRIG 175 (H) 07/08/2009     Lab Results   Component Value Date    WBC 8.1 08/18/2017    HGB 16.1 08/18/2017    HCT 43.7 08/18/2017     08/18/2017     Lab Results   Component Value Date    CREATININE 0.92 08/18/2017    BUN 14 08/18/2017     08/18/2017    K 3.8 08/18/2017    CO2 21 (L) 08/18/2017     Review of Systems:   General: WNL  Eyes: WNL  Ears/Nose/Throat: WNL  Lungs: WNL  Heart: WNL  Stomach: WNL  Bladder: WNL  Muscle/Joints: WNL  Skin: WNL  Nervous System: WNL  Mental Health: WNL     Blood: WNL        "

## 2021-06-25 NOTE — PROGRESS NOTES
Progress Notes by Dinesh Lira RN at 8/21/2017  3:24 PM     Author: Dinesh Lira RN Service: -- Author Type: Registered Nurse    Filed: 8/21/2017  4:22 PM Encounter Date: 8/21/2017 Status: Signed    : Rita Irizarry MD (Physician)    Related Notes: Original Note by Dinesh Lira RN (Registered Nurse) filed at 8/21/2017  3:59 PM            Further Cardiovascular Outcomes Research With PCSK9 Inhibition in Subjects With  Elevated Risk Open-label Extension: FOURIER OLE study        SERIOUS ADVERSE EVENT: Subject experienced chest pain and light headedness Friday morning and went to [facility] ER for evaluation. [facility] was [facility] that morning for observation overnight.  No troponin elevations,  EKG showed sinus fatuma with no significant changes, and Echo showed 55-60% ef. Cardiologist Dr. Mcnally attributed his symptoms to Bradycardia -  his Metoprolol 25mg QD was held and his symptoms improved.  Subject took his biweekly study medication today Monday 21Aug2017 as scheduled.  Previously dose was Monday 07Aug2017.     Adverse Event: Intermittent Bradycardia as manifested by chest pain and light headedness               Serious: Yes  Severity CTCAE: 3   Start Date: 18Aug2017  End Date: 19Aug2017  Dr. Irizarry: Please assign causality of above EHSAN  Relationship: Is there a reasonable possibility that the event may have been caused by Investigational Medicinal Product?     NO  Relationship: Is there a reasonable possibility that the event may have been caused by a statin?      NO  Relationship: Is there a reasonable possibility that the event may have been caused by a non-statin lipid regulating medication?     NO  Relationship: Is there a reasonable possibility that the event may have been caused by the investigational device?      NO  Relationship: Is there a reasonable possibility that the event may have been caused by study procedure/activity?    NO

## 2021-06-25 NOTE — PROGRESS NOTES
Progress Notes by Dinesh Lira, RN at 11/13/2017  7:30 AM     Author: Dinesh Lira, RN Service: -- Author Type: Registered Nurse    Filed: 11/13/2017 10:19 AM Encounter Date: 11/13/2017 Status: Signed    : Dinesh Lira RN (Registered Nurse)         FOURIER-OLE STUDY:  A Multicenter, Open-label, Single-arm, Extension Study to Assess  Long-term Safety of Evolocumab Therapy in Patients With Clinically Evident  Cardiovascular Disease    Subject seen for Week 60 Study Drug Resupply    There were no assessments or other activities performed as this visit was only to return study drug pens & boxes and resupply.  Will see subject in 3 months for next study visit in clinic.     Subject returned Used pens & boxes   Used pens #3 [id number]   Used pens #3 [id number]    Sent home with new IP   1 box 3 pens of box [id number]   1 box 3 pens of box [id number]     Dinesh Lira RN  Clinical Trials Nurse

## 2021-06-26 NOTE — PROGRESS NOTES
Progress Notes by Dinesh Lira RN at 10/9/2018  9:30 AM     Author: Dinesh Lira RN Service: -- Author Type: Registered Nurse    Filed: 10/9/2018  9:51 AM Encounter Date: 10/9/2018 Status: Signed    : Dinesh Lira RN (Registered Nurse)         FOURIER-OLE STUDY:  A Multicenter, Open-label, Single-arm, Extension Study to Assess  Long-term Safety of Evolocumab Therapy in Patients With Clinically Evident  Cardiovascular Disease    Subject seen for Week 84 Study Drug Resupply    There were no assessments or other activities performed as this visit was only to return study drug pens & boxes and resupply.  Will see subject in 3 months for next study visit in clinic.     Subject returned Used pens & boxes. Subject reports no issues with pens or administration.   Used pens #3 [id number]   Used pens #3 [id number]     Sent home with new IP   1 box 3 pens of box [id number]   1 box 3 pens of box [id number]      Dinesh Lira RN  Clinical Trials Nurse

## 2021-06-26 NOTE — PROGRESS NOTES
Progress Notes by Dinesh Lira RN at 4/23/2018  7:30 AM     Author: Dinesh Lira RN Service: -- Author Type: Registered Nurse    Filed: 4/23/2018 10:00 AM Encounter Date: 4/23/2018 Status: Signed    : Dinesh Lira RN (Registered Nurse)         FOURIER-OLE STUDY:  A Multicenter, Open-label, Single-arm, Extension Study to Assess  Long-term Safety of Evolocumab Therapy in Patients With Clinically Evident  Cardiovascular Disease    Subject seen for Week 84 Study Drug Resupply    There were no assessments or other activities performed as this visit was only to return study drug pens & boxes and resupply.  Will see subject in 3 months for next study visit in clinic.     Subject returned Used pens & boxes. Subject reports no issues with pens or administration.   Used pens #2 [id number]   Used pens #3 [id number]    Sent home with new IP   1 box 3 pens of box [id number]   1 box 3 pens of box [id number]        Dinesh Lira RN  Clinical Trials Nurse

## 2021-06-27 NOTE — PROGRESS NOTES
Progress Notes by Dinesh Lira RN at 4/2/2019  7:30 AM     Author: Dinesh Lira, RN Service: -- Author Type: Registered Nurse    Filed: 4/2/2019  7:56 AM Encounter Date: 4/2/2019 Status: Signed    : Dinesh Lira RN (Registered Nurse)         FOURIER-OLE STUDY:  A Multicenter, Open-label, Single-arm, Extension Study to Assess  Long-term Safety of Evolocumab Therapy in Patients With Clinically Evident  Cardiovascular Disease    Subject seen for Week 132 Study Drug Resupply    There were no assessments or other activities performed as this visit was only to return study drug pens & boxes and resupply.  Will see subject in 3 months for next study visit in clinic.     Subject returned Used pens & boxes. Subject reports no issues with pens or administration.   Used pens #3  [id number]   Used pens #3 [id number]        Sent home with new IP   1 box 3 pens of box [id number]   1 box 3 pens of box [id number]        Dinesh Lira RN  Clinical Trials Nurse

## 2021-06-28 NOTE — PROGRESS NOTES
Progress Notes by Jenna Matamoros RN at 3/3/2020  7:30 AM     Author: Jenna Matamoros RN Service: -- Author Type: Registered Nurse    Filed: 3/3/2020  8:07 AM Encounter Date: 3/3/2020 Status: Signed    : Jenna Matamoros RN (Registered Nurse)           Subject dispensed with    [id number] -3 pens   [id number] -3 pens    he returned:    [id number]-box & 3 used pens   [id number]-box & 3 used pens    Will call to schedule Week 198 visit (due ~05.21.2020).    Jenna Matamoros RN, BSN  Clinical Trials Nurse

## 2021-06-28 NOTE — PROGRESS NOTES
Progress Notes by Dinesh Lira RN at 9/17/2019  7:30 AM     Author: Dinesh Lira RN Service: -- Author Type: Registered Nurse    Filed: 9/17/2019  9:40 AM Encounter Date: 9/17/2019 Status: Signed    : Dinesh Lira RN (Registered Nurse)         FOURIER-OLE STUDY:  A Multicenter, Open-label, Single-arm, Extension Study to Assess  Long-term Safety of Evolocumab Therapy in Patients With Clinically Evident  Cardiovascular Disease    Subject seen for Week 156 Study Drug Resupply    There were no assessments or other activities performed as this visit was only to return study drug pens & boxes and resupply.  Will see subject in 3 months for next study visit in clinic.     Subject returned Used pens & boxes. Subject reports no issues with pens or administration.   Used pens #3 of [id number]   Used pens #3 of [id number]    Sent home with new IP   1 box 3 pens of box [id number]   1 box 3 pens of box [id number]     Dinesh Lira RN  Clinical Trials Nurse

## 2021-06-29 NOTE — PROGRESS NOTES
"Progress Notes by Rita Irizarry MD at 4/23/2020  9:50 AM     Author: Rita Irizarry MD Service: -- Author Type: Physician    Filed: 4/23/2020 10:09 AM Encounter Date: 4/23/2020 Status: Signed    : Rita Irizarry MD (Physician)           The patient has been notified of following:     \"This video visit will be conducted via a call between you and your physician/provider. We have found that certain health care needs can be provided without the need for an in-person physical exam.  This service lets us provide the care you need with a video conversation.  If a prescription is necessary we can send it directly to your pharmacy.  If lab work is needed we can place an order for that and you can then stop by our lab to have the test done at a later time.      Patient has given verbal consent to a Video visit? Yes    HEART CARE VIDEO ENCOUNTER        The patient has chosen to have the visit conducted as a video visit, to reduce risk of exposure given the current status of Coronavirus in our community. This video visit is being conducted via a call between the patient and physician/provider. Health care needs are being provided without a physical exam.     Assessment/Recommendations   Assessment/Plan:  1. Coronary artery disease due to calcified coronary lesion --angiography August 2015 showed 20% mid left main lesion, proximal total occlusion of the LAD, circumflex with mid lesion total occlusion, and right coronary artery with drug-coated stents to the right coronary artery.  Chronic Plavix therapy, preventive therapy, and he is asymptomatic.  If he has increased symptoms discussion was about  procedure to the circumflex.   2. S/P CABG (coronary artery bypass graft) --patient had bypass in July 2009 with a LIMA to the LAD, vein graft and OM 3, and both of these are patent.    Given progression of disease despite bypass he is on chronic Plavix therapy.  Given that his bypass was over 10 years ago will " arrange for stress testing every other year.   3. Pure hypercholesterolemia -on PCSK9 inhibitor possibly with an minimal atorvastatin.   4. Essential hypertension -47096 and need to monitor.       Plan  1.  Arrange for exercise stress test.  Follow Up Plan: 12 months  I have reviewed the note as documented.  This accurately captures the substance of my conversation with the patient.    Total time of video between patient and provider was 10 minutes   Start Time:0950   Stop Time:1000    Originating Location (pt. Location): Home    Distant Location (provider location):  [facility]      Mode of Communication:  Video Conference via [email or web address]       History of Present Illness/Subjective    Aníbal Espinoza is a 54 y.o. male who is being evaluated via a billable video visit and has consented to a video visit. Aníbal Espinoza has a history of coronary artery disease and bypass surgery for yearly follow-up.  He is not working right now due to virus pandemic. Patient complains of no syncope, dizziness, fatigue, fevers, chest pain, palpitations, shortness of breath, PND, orthopnea, nausea, vomiting, or edema.      I have reviewed and updated the patient's Past Medical History, Social History, Family History and Medication List.     Physical Examination performed via live video encounter Review of Systems   General Appearance:   no distress, normal body habitus, upright.   ENT/Mouth: membranes moist, no nasal discharge or bleeding gums.  Normal head shape, no evidence of injury or laceration.     EYES:  no scleral icterus, normal conjunctivae   Neck: no evidence of thyromegaly.  Supple   Chest/Lungs:   No audible wheezing equal chest wall expansion. Non labored breathing.  No cough.   Cardiovascular:   No evidence of elevated jugular venous pressure.  No evidence of pitting edema bilaterally    Abdomen:  no evidence of abdominal distention. No observe juandice.     Extremities: no cyanosis or clubbing noted.    Skin: no  xanthelasma, normal skin collar. No evidence of facial lacerations.      Neurologic: Normal arm motion bilateral, no tremors.  No evidence of focal defect.       Psychiatric: alert and oriented x3, calm     Review of systems are within normal limits                                         Medical History  Surgical History Family History Social History   Past Medical History:   Diagnosis Date   ? Anxiety disorder    ? Coronary artery disease due to calcified coronary lesion 07/07/2009    also noted 8/22/2015   ? Dyslipidemia, goal LDL below 70 07/07/2009    diagnosed when presented for MI   ? Essential hypertension 07/07/2009   ? History of MI (myocardial infarction) 07/07/2009   ? Panic disorder     Past Surgical History:   Procedure Laterality Date   ? CARDIAC CATHETERIZATION  8/31/15   ? CORONARY ARTERY BYPASS GRAFT  07/10/2009    CAB X2 with LIMA to LAD and SVG to OM by Dr. Duncan at [facility]   ? CORONARY STENT PLACEMENT  01/17/2014    3 ENID to mid to distal RCA by Dr. Mcqueen; SVG to OM was occluded but LIMA to LAD had collaterals to OM    Family History   Problem Relation Age of Onset   ? Heart attack Mother    ? Stroke Mother 81      Social History     Socioeconomic History   ? Marital status: Life Partner     Spouse name: Jamar   ? Number of children: 0   ? Years of education: Not on file   ? Highest education level: Not on file   Occupational History   ? Occupation:      Employer: SELF EMPLOYED   Social Needs   ? Financial resource strain: Not on file   ? Food insecurity     Worry: Not on file     Inability: Not on file   ? Transportation needs     Medical: Not on file     Non-medical: Not on file   Tobacco Use   ? Smoking status: Former Smoker     Packs/day: 1.00     Years: 35.00     Pack years: 35.00     Types: Cigarettes     Last attempt to quit: 7/8/2009     Years since quitting: 10.8   ? Smokeless tobacco: Never Used   Substance and Sexual Activity   ? Alcohol use: No   ? Drug use: No    ? Sexual activity: Yes     Partners: Male   Lifestyle   ? Physical activity     Days per week: Not on file     Minutes per session: Not on file   ? Stress: Not on file   Relationships   ? Social connections     Talks on phone: Not on file     Gets together: Not on file     Attends Confucianism service: Not on file     Active member of club or organization: Not on file     Attends meetings of clubs or organizations: Not on file     Relationship status: Not on file   ? Intimate partner violence     Fear of current or ex partner: Not on file     Emotionally abused: Not on file     Physically abused: Not on file     Forced sexual activity: Not on file   Other Topics Concern   ? Not on file   Social History Narrative   ? Not on file          Medications  Allergies   Current Outpatient Medications   Medication Sig Dispense Refill   ? amLODIPine (NORVASC) 2.5 MG tablet TAKE 1 TABLET BY MOUTH EVERY DAY 90 tablet 0   ? atorvastatin (LIPITOR) 10 MG tablet TAKE 1/2 TABLET BY MOUTH NIGHTLY AT BEDTIME (Patient taking differently: Take 5 mg by mouth every other day. Take 0.5 tablets (5mg total) by mouth every other day) 45 tablet 1   ? cholecalciferol, vitamin D3, 5,000 unit Tab Take 125 mcg by mouth.     ? clopidogreL (PLAVIX) 75 mg tablet TAKE 1 TABLET BY MOUTH EVERY DAY 90 tablet 0   ? nitroglycerin (NITROSTAT) 0.4 MG SL tablet Place 1 tablet (0.4 mg total) under the tongue every 5 (five) minutes as needed for chest pain. 20 tablet 4     Current Facility-Administered Medications   Medication Dose Route Frequency Provider Last Rate Last Dose   ? Study Drug evolocumab 140 mg/mL injector pen 1 Syringe ()  1 Syringe Subcutaneous Q14 Days Jenna Matamoros RN        No Known Allergies      Lab Results    Chemistry/lipid CBC Cardiac Enzymes/BNP/TSH/INR   Lab Results   Component Value Date    CHOL 144 01/17/2014    HDL 29 (L) 01/17/2014    LDLCALC 95 01/17/2014    TRIG 105 01/17/2014    CREATININE 0.92 08/18/2017    BUN 14  08/18/2017    K 3.8 08/18/2017     08/18/2017     (H) 08/18/2017    CO2 21 (L) 08/18/2017    Lab Results   Component Value Date    WBC 8.1 08/18/2017    HGB 16.1 08/18/2017    HCT 43.7 08/18/2017    MCV 94 08/18/2017     08/18/2017    Lab Results   Component Value Date    CKMB 39 (HH) 07/11/2009    TROPONINI <0.01 08/18/2017     (H) 07/08/2009    INR 1.09 08/18/2017        Rita Irizarry

## 2021-06-29 NOTE — PROGRESS NOTES
Progress Notes by Dinesh Lira, RN at 8/18/2020  8:00 AM     Author: Dinesh Lira, RN Service: -- Author Type: Registered Nurse    Filed: 8/18/2020  9:11 AM Encounter Date: 8/18/2020 Status: Signed    : Dinesh Lira RN (Registered Nurse)         FOURIER-OLE STUDY:  A Multicenter, Open-label, Single-arm, Extension Study to Assess  Long-term Safety of Evolocumab Therapy in Patients With Clinically Evident  Cardiovascular Disease    Subject seen for Week 204 Study Drug Resupply    There were no assessments or other activities performed as this visit was only to return study drug pens & boxes and resupply.  Will see subject in 3 months for next study visit in clinic.  Covid19 precautions and screening taken.     Subject returned Used pens & boxes. Subject reports no issues with pens or administration.   Used pens #3 of [id number]   Used pens #3 of [id number]      Sent home with new IP   1 box 3 pens of box [id number]    1 box 3 pens of box [id number]    Dinesh Lira RN  Clinical Trials Nurse

## 2021-06-30 NOTE — PROGRESS NOTES
Progress Notes by Dinesh Lira, RN at 2/2/2021  9:00 AM     Author: Dinesh Lira, RN Service: -- Author Type: Registered Nurse    Filed: 2/2/2021 10:09 AM Encounter Date: 2/2/2021 Status: Signed    : Dinesh Lira RN (Registered Nurse)         FOURIER-OLE STUDY:  A Multicenter, Open-label, Single-arm, Extension Study to Assess  Long-term Safety of Evolocumab Therapy in Patients With Clinically Evident  Cardiovascular Disease    Subject seen for Week 228 Study Drug Resupply    There were no assessments or other activities performed as this visit was only to return study drug pens & boxes and resupply.  Will see subject in 3 months for next study visit in clinic.  Covid19 precautions and screening taken.     Subject returned Used pens & boxes. Subject reports no issues with pens or administration.   Used pens #3 of [id number]   Used pens #3 of [id number]    Sent home with new IP   1 box 3 pens of box [id number]   1 box 3 pens of box [id number]     Dinesh Lira RN  Clinical Trials Nurse

## 2021-07-04 NOTE — PROGRESS NOTES
"Progress Notes by Rita Irizarry MD at 6/2/2021  2:50 PM     Author: Rita Irizarry MD Service: -- Author Type: Physician    Filed: 6/2/2021  3:37 PM Encounter Date: 6/2/2021 Status: Signed    : Rita Irizarry MD (Physician)           The patient has been notified of following:     \"This video visit will be conducted via a call between you and your physician/provider. We have found that certain health care needs can be provided without the need for an in-person physical exam.  This service lets us provide the care you need with a video conversation.  If a prescription is necessary we can send it directly to your pharmacy.  If lab work is needed we can place an order for that and you can then stop by our lab to have the test done at a later time.      Patient has given verbal consent to a Video visit? Yes    HEART CARE VIDEO ENCOUNTER        The patient has chosen to have the visit conducted as a video visit, to reduce risk of exposure given the current status of Coronavirus in our community. This video visit is being conducted via a call between the patient and physician/provider. Health care needs are being provided without a physical exam.     Assessment/Recommendations   Assessment/Plan:  1. S/P CABG (coronary artery bypass graft) --patient had bypass in July 2009 with a LIMA to the LAD, vein graft and OM 3, and both of these are patent.    Given progression of disease despite bypass he is on chronic Plavix therapy.  Given that his bypass was over 10 years ago will arrange for stress testing every other year, however he would like to wait till the mask mandate is off and once mask mandate is released we will then bring him in for stress test.   2. Coronary artery disease due to calcified coronary lesion -angiography August 2015 showed 20% mid left main lesion, proximal total occlusion of the LAD, circumflex with mid lesion total occlusion, and right coronary artery with drug-coated stents to the " right coronary artery.  Chronic Plavix therapy, preventive therapy, and he is asymptomatic.  If he has increased symptoms discussion was about  procedure to the circumflex.   3. Pure hypercholesterolemia-he is in the open label extension of the Fourier trial and his total cholesterol is 74 with an LDL of 25.  We will consider him for Olpe study.   4. Essential hypertension-excellent control with 130/63 at home.   5. Examination of participant in clinical trial-as above in the open label extension.       Plan  1.  Once mask mandate release, consider stress test.  2.  Consider for enrollment in the Claudy study.  Follow Up Plan:  12 months  I have reviewed the note as documented.  This accurately captures the substance of my conversation with the patient.    Total time of video between patient and provider was 10 minutes   Start Time: 1525  Stop Time: 1535    Originating Location (pt. Location): Home    Distant Location (provider location):  [facility]     Mode of Communication:  Video Conference via [email or web address]       History of Present Illness/Subjective    Aníbal Espinoza is a 55 y.o. male who is being evaluated via a billable video visit and has consented to a video visit. Aníbal Espinoza has a history of coronary artery disease and bypass surgery for yearly follow-up.  Due to the fact that he does not want to wear a mask in public this was a video conference.  He is still working, still does not have health insurance, his partner does not have health insurance either.  He is functioning well. Patient complains of no syncope, dizziness, fatigue, fevers, chest pain, palpitations, shortness of breath, PND, orthopnea, nausea, vomiting, or edema.      I have reviewed and updated the patient's Past Medical History, Social History, Family History and Medication List.     Physical Examination performed via live video encounter Review of Systems   General Appearance:   no distress, normal body  habitus, upright.   ENT/Mouth: membranes moist, no nasal discharge or bleeding gums.  Normal head shape, no evidence of injury or laceration.     EYES:  no scleral icterus, normal conjunctivae   Neck: no evidence of thyromegaly.  Supple   Chest/Lungs:   No audible wheezing equal chest wall expansion. Non labored breathing.  No cough.   Cardiovascular:   No evidence of elevated jugular venous pressure.  No evidence of pitting edema bilaterally    Abdomen:  no evidence of abdominal distention. No observe juandice.     Extremities: no cyanosis or clubbing noted.    Skin: no xanthelasma, normal skin collar. No evidence of facial lacerations.      Neurologic: Normal arm motion bilateral, no tremors.  No evidence of focal defect.       Psychiatric: alert and oriented x3, calm     Review of systems are within normal limits                                         Medical History  Surgical History Family History Social History   Past Medical History:   Diagnosis Date   ? Anxiety disorder    ? Coronary artery disease due to calcified coronary lesion 07/07/2009    also noted 8/22/2015   ? Dyslipidemia, goal LDL below 70 07/07/2009    diagnosed when presented for MI   ? Essential hypertension 07/07/2009   ? History of MI (myocardial infarction) 07/07/2009   ? Panic disorder     Past Surgical History:   Procedure Laterality Date   ? CARDIAC CATHETERIZATION  8/31/15   ? CORONARY ARTERY BYPASS GRAFT  07/10/2009    CAB X2 with LIMA to LAD and SVG to OM by Dr. Duncan at [facility]   ? CORONARY STENT PLACEMENT  01/17/2014    3 ENID to mid to distal RCA by Dr. Mcqueen; SVG to OM was occluded but LIMA to LAD had collaterals to OM    Family History   Problem Relation Age of Onset   ? Heart attack Mother    ? Stroke Mother 81      Social History     Socioeconomic History   ? Marital status: Life Partner     Spouse name: Jamar   ? Number of children: 0   ? Years of education: Not on file   ? Highest education level: Not on file    Occupational History   ? Occupation:      Employer: SELF EMPLOYED   Social Needs   ? Financial resource strain: Not on file   ? Food insecurity     Worry: Not on file     Inability: Not on file   ? Transportation needs     Medical: Not on file     Non-medical: Not on file   Tobacco Use   ? Smoking status: Former Smoker     Packs/day: 1.00     Years: 35.00     Pack years: 35.00     Types: Cigarettes     Quit date: 2009     Years since quittin.9   ? Smokeless tobacco: Never Used   Substance and Sexual Activity   ? Alcohol use: No   ? Drug use: No   ? Sexual activity: Yes     Partners: Male   Lifestyle   ? Physical activity     Days per week: Not on file     Minutes per session: Not on file   ? Stress: Not on file   Relationships   ? Social connections     Talks on phone: Not on file     Gets together: Not on file     Attends Adventism service: Not on file     Active member of club or organization: Not on file     Attends meetings of clubs or organizations: Not on file     Relationship status: Not on file   ? Intimate partner violence     Fear of current or ex partner: Not on file     Emotionally abused: Not on file     Physically abused: Not on file     Forced sexual activity: Not on file   Other Topics Concern   ? Not on file   Social History Narrative   ? Not on file          Medications  Allergies   Current Outpatient Medications   Medication Sig Dispense Refill   ? amLODIPine (NORVASC) 2.5 MG tablet TAKE 1 TABLET BY MOUTH EVERY DAY 90 tablet 1   ? atorvastatin (LIPITOR) 10 MG tablet TAKE 1/2 TABLET BY MOUTH NIGHTLY AT BEDTIME (Patient taking differently: TAKE 1/2 TABLET BY MOUTH EVERY OTHER NIGHT AT BEDTIME) 45 tablet 1   ? cholecalciferol, vitamin D3, 5,000 unit Tab Take 125 mcg by mouth daily.      ? clopidogreL (PLAVIX) 75 mg tablet TAKE 1 TABLET BY MOUTH EVERY DAY 90 tablet 0   ? nitroglycerin (NITROSTAT) 0.4 MG SL tablet Place 1 tablet (0.4 mg total) under the tongue every 5 (five)  minutes as needed for chest pain. 20 tablet 4     Current Facility-Administered Medications   Medication Dose Route Frequency Provider Last Rate Last Admin   ? Study Drug evolocumab 140 mg/mL injector pen 1 Syringe ()  1 Syringe Subcutaneous Q14 Days Dinesh Lira, RN        No Known Allergies      Lab Results    Chemistry/lipid CBC Cardiac Enzymes/BNP/TSH/INR   Lab Results   Component Value Date    CHOL 144 01/17/2014    HDL 29 (L) 01/17/2014    LDLCALC 95 01/17/2014    TRIG 105 01/17/2014    CREATININE 0.92 08/18/2017    BUN 14 08/18/2017    K 3.8 08/18/2017     08/18/2017     (H) 08/18/2017    CO2 21 (L) 08/18/2017    Lab Results   Component Value Date    WBC 8.1 08/18/2017    HGB 16.1 08/18/2017    HCT 43.7 08/18/2017    MCV 94 08/18/2017     08/18/2017    Lab Results   Component Value Date    CKMB 39 (HH) 07/11/2009    TROPONINI <0.01 08/18/2017     (H) 07/08/2009    INR 1.09 08/18/2017        Rita Irizarry

## 2021-07-15 ENCOUNTER — TELEPHONE (OUTPATIENT)
Dept: CARDIOLOGY | Facility: CLINIC | Age: 56
End: 2021-07-15

## 2021-07-15 NOTE — TELEPHONE ENCOUNTER
----- Message from Katerine Villar sent at 7/15/2021  8:17 AM CDT -----  Regarding: LBF pt  General phone call:    Caller: Aníbal   Primary cardiologist: NICOLE    Detailed reason for call: Patient is calling to let LBF know that he is going to be moving out of state at the end of August     Best phone number: [phone]  Best time to contact: any   Ok to leave a detailed message? yes  Device? no    Additional Info:        Noted will route to update LBF. -[facility]      Dr. Irizarry,  Just an FYI that patient is moving.   Thanks,  Mal    Walk in

## 2021-07-21 ENCOUNTER — VIRTUAL VISIT (OUTPATIENT)
Dept: CARDIOLOGY | Facility: CLINIC | Age: 56
End: 2021-07-21

## 2021-07-21 DIAGNOSIS — I25.10 CAD (CORONARY ARTERY DISEASE): Primary | ICD-10-CM

## 2021-07-21 PROCEDURE — 99207 PR NO CHARGE-RESEARCH SERVICE: CPT

## 2021-07-21 NOTE — PROGRESS NOTES
FOURIER-OLE STUDY:  A Multicenter, Open-label, Single-arm, Extension Study to Assess  Long-term Safety of Evolocumab Therapy in Patients With Clinically Evident  Cardiovascular Disease    Subject seen for Week 252 Study Drug Resupply    There were no assessments or other activities performed as this visit was only to return study drug pens & boxes and resupply.  Will see subject in 2 months for next study visit in clinic.  Covid19 precautions and screening taken.     Subject returned Used pens & boxes. Subject reports no issues with pens or administration.   Used pens #3 of [id number]   Used pens #3 of [id number]    Sent home with new IP   1 box 3 pens of box [id number]    1 box 3 pens of box [id number]     Aníbal will be leaving the state soon so I've followed up with his care team about early wrap up and continuation of treatment.    Dinesh Lira RN  Clinical Trials Nurse

## 2021-07-28 ENCOUNTER — TELEPHONE (OUTPATIENT)
Dept: CARDIOLOGY | Facility: CLINIC | Age: 56
End: 2021-07-28

## 2021-07-28 DIAGNOSIS — I25.10 CAD (CORONARY ARTERY DISEASE): ICD-10-CM

## 2021-07-28 RX ORDER — CLOPIDOGREL BISULFATE 75 MG/1
TABLET ORAL
Qty: 90 TABLET | Refills: 3 | Status: SHIPPED | OUTPATIENT
Start: 2021-07-28

## 2021-07-28 NOTE — TELEPHONE ENCOUNTER
----- Message from Dougie BOWLING Munoz sent at 7/28/2021  1:38 PM CDT -----  Regarding: LBF pt  General phone call:    Caller: Gayatri [facility] pharmacist   Primary cardiologist: NICOLE  Detailed reason for call: gayatri has question about medication (CLOPIDPGREL) plavix 75 mg for PT.    Best phone number: [phone]  Best time to contact: any  Ok to leave a detailed message? yes  Device? no    Additional Info:         Called pharmacy to answer any questions that they had. They simply needed a refill of patient's plavix. This was done; pt is on chronic therapy. -mjx

## 2021-08-06 NOTE — PROGRESS NOTES
"Progress Notes by Dinesh Lira, RN at 1/8/2019  7:30 AM     Author: Dinesh Lira, RN Service: -- Author Type: Registered Nurse    Filed: 1/8/2019  9:13 AM Encounter Date: 1/8/2019 Status: Signed    : Dinesh Lira, RN (Registered Nurse)           Further Cardiovascular Outcomes Research With PCSK9 Inhibition in Subjects With  Elevated Risk Open-label Extension: FOURIER OLE study     Subject seen in clinic for Week 120 study visit    Vitals:    01/08/19 0732   BP: 130/68   Patient Site: Left Arm   Patient Position: Sitting   Cuff Size: Adult Large   Pulse: 64   Resp: 18   Weight: 201 lb (91.2 kg)   Height: 5' 9\" (1.753 m)     Labs drawn per protocol.     Subject returned Used pens & boxes   Used pens #3 [id number]    Used pens #3 [id number]     Subject is not due for an injection in clinic today.    Sent home with new IP   1 box 3 pens of box [id number]   1 box 3 pens of box [id number]    Subject reports no adverse events, endpoints, or significant changes in health.     Plan: We will see subject back for week 144 study visit in 6 months.  We have a study drug resupply visit in 3 months       Current Outpatient Medications:   ?  amLODIPine (NORVASC) 2.5 MG tablet, TAKE 1 TABLET (2.5 MG TOTAL) BY MOUTH AT BEDTIME., Disp: 90 tablet, Rfl: 2  ?  atorvastatin (LIPITOR) 10 MG tablet, TAKE 1/2 TABLET BY MOUTH NIGHTLY AT BEDTIME, Disp: 45 tablet, Rfl: 1  ?  clopidogrel (PLAVIX) 75 mg tablet, TAKE 1 TABLET (75 MG TOTAL) BY MOUTH DAILY., Disp: 90 tablet, Rfl: 1  ?  diazePAM (VALIUM) 2 MG tablet, Take 1 tablet (2 mg total) by mouth daily as needed for anxiety., Disp: 5 tablet, Rfl: 0  ?  isosorbide mononitrate (IMDUR) 30 MG 24 hr tablet, Take 30 mg by mouth daily. , Disp: , Rfl:   ?  Study Drug evolocumab 140 mg/mL (), Inject 1 Syringe under the skin every 14 (fourteen) days., Disp: , Rfl:     Current Facility-Administered Medications:   ?  Study Drug evolocumab 140 mg/mL injector pen 1 Syringe (AMG " 145), 1 Syringe, Subcutaneous, Q14 Days, Dinesh Lira, RN    Dinesh Lira, RN  Clinical Trials Nurse

## 2021-08-06 NOTE — PROGRESS NOTES
Progress Notes by Dinesh Lira RN at 8/21/2017  3:24 PM     Author: Dinesh Lira RN Service: -- Author Type: Registered Nurse    Filed: 8/21/2017  3:58 PM Encounter Date: 8/21/2017 Status: Signed    : Dinesh Lira RN (Registered Nurse)           Further Cardiovascular Outcomes Research With PCSK9 Inhibition in Subjects With  Elevated Risk Open-label Extension: FOURIER OLE study      SERIOUS ADVERSE EVENT: Subject experienced chest pain and light headedness Friday morning and went to [facility] ER for evaluation. He was admitted to [facility] that morning for observation overnight.  No troponin elevations,  EKG showed sinus fatuma with no significant changes, and Echo showed 55-60% ef. Cardiologist Dr. Mcnally attributed his symptoms to Bradycardia -  his Metoprolol 25mg QD was held and his symptoms improved.  Subject took his biweekly study medication today Monday 21Aug2017 as scheduled.  Previously dose was Monday 07Aug2017.    Adverse Event: Intermittent Bradycardia as manifested by chest pain and light headedness   Serious: Yes  Severity CTCAE: 3   Start Date: 18Aug2017  End Date: 19Aug2017  Dr. Irizarry: Please assign causality of above AE  Relationship: Is there a reasonable possibility that the event may have been caused by Investigational Medicinal Product?  YES   /    NO  Relationship: Is there a reasonable possibility that the event may have been caused by a statin?  YES   /    NO  Relationship: Is there a reasonable possibility that the event may have been caused by a non-statin lipid regulating medication?  YES   /    NO  Relationship: Is there a reasonable possibility that the event may have been caused by the investigational device?  YES   /    NO  Relationship: Is there a reasonable possibility that the event may have been caused by study procedure/activity?  YES   /    NO

## 2021-08-06 NOTE — PROGRESS NOTES
Progress Notes by Dinesh Lira, RN at 4/28/2021  9:00 AM     Author: Dinesh Lira, RN Service: -- Author Type: Registered Nurse    Filed: 4/28/2021  2:25 PM Encounter Date: 4/28/2021 Status: Signed    : Dinesh Lira, RN (Registered Nurse)           Further Cardiovascular Outcomes Research With PCSK9 Inhibition in Subjects With  Elevated Risk Open-label Extension: [facility] OLE study    Subject seen curbside today for Week 240 study visit.    Re-consent process:   Research nurse met with subject to discuss reconsent and updates in the above noted study.    Updates to consent (in brief-full details available in consent from-see Media tab):    Updated address for study visits     Reviewed consent/HIPAA and these changes with Aníbal Espinoza   Questions answered to their satisfaction.  Aníbal Espinoza signed the consent forms (Version date [id number]) and (HIPAA [facility]) today.     Copies given to him & sent to medical records.    Subject returned Used pens & boxes   Used pens #3 [id number]   Used pens #3 [id number]    Subject is not due for an injection in clinic today.    Sent home with new IP   1 box 3 pens of box [id number]   1 box 3 pens of box [id number]     Subject reports no adverse events, endpoints, or significant changes in health.  Reports no positive covid19 test or symptoms.    Plan: We will see subject back for Week 260/End of study visit in about 5 months.  We have a study drug resupply visit in 3 months.      Current Outpatient Medications:   ?  amLODIPine (NORVASC) 2.5 MG tablet, TAKE 1 TABLET BY MOUTH EVERY DAY, Disp: 90 tablet, Rfl: 1  ?  atorvastatin (LIPITOR) 10 MG tablet, TAKE 1/2 TABLET BY MOUTH NIGHTLY AT BEDTIME (Patient taking differently: TAKE 1/2 TABLET BY MOUTH EVERY OTHER NIGHT AT BEDTIME), Disp: 45 tablet, Rfl: 1  ?  cholecalciferol, vitamin D3, 5,000 unit Tab, Take 125 mcg by mouth., Disp: , Rfl:   ?  clopidogreL (PLAVIX) 75 mg tablet, TAKE 1 TABLET BY MOUTH  EVERY DAY, Disp: 90 tablet, Rfl: 0  ?  nitroglycerin (NITROSTAT) 0.4 MG SL tablet, Place 1 tablet (0.4 mg total) under the tongue every 5 (five) minutes as needed for chest pain., Disp: 20 tablet, Rfl: 4    Current Facility-Administered Medications:   ?  Study Drug evolocumab 140 mg/mL injector pen 1 Syringe (), 1 Syringe, Subcutaneous, Q14 Days, Dinesh Lira, RN    Dinesh Lira RN  Clinical Trials Nurse

## 2021-08-06 NOTE — PROGRESS NOTES
Progress Notes by Dinesh Lira, RN at 5/27/2020  7:30 AM     Author: Dinesh Lira, RN Service: -- Author Type: Registered Nurse    Filed: 5/27/2020  8:02 AM Encounter Date: 5/27/2020 Status: Signed    : Dinesh Lira RN (Registered Nurse)           Further Cardiovascular Outcomes Research With PCSK9 Inhibition in Subjects With  Elevated Risk Open-label Extension: FOURIER OLE study    Subject contacted via telephone and on site drug resupply (due to Covid19 policy) for Week 192 study visit    Research nurse discussed updated [facility] consent form addendum and new [facility] HIPAA form as below.     The study discussion included the following:     Changes made to IRB of record which is now [facility] IRB    New HIPAA form to include [facility] language     Subject asked questions and agreed that @she/he received answers that satisfied @her/him.     Consent form [Version Date: 01Nov2019] [approved for use and effective on 2/7/2020] signed.   HIPAA form Revision Date 09.14.18 also signed.     A signed copy was given to the subject & a copy was forwarded to medical records.    There were no vitals filed for this visit. - Not done per Covid19 policy  Labs drawn per protocol - Not done per Covid19 policy  Subject denies covid symptoms or contacts.     Subject returned Used pens & boxes   Used pens #3 [id number]   Used pens #3 [id number]    Subject will administer drug at home per Covid19 policy    Sent home with new IP   1 box 3 pens of box [id number]   1 box 3 pens of box [id number]     Subject reports no adverse events, endpoints, or significant changes in health.     Plan: We will see subject back for Week 216 study visit in 6 months.  We have a study drug resupply visit in 3 months.       Current Outpatient Medications:   ?  amLODIPine (NORVASC) 2.5 MG tablet, TAKE 1 TABLET BY MOUTH EVERY DAY, Disp: 90 tablet, Rfl: 0  ?  atorvastatin (LIPITOR) 10 MG tablet, TAKE 1/2 TABLET BY MOUTH NIGHTLY AT BEDTIME  (Patient taking differently: Take 5 mg by mouth every other day. Take 0.5 tablets (5mg total) by mouth every other day), Disp: 45 tablet, Rfl: 1  ?  cholecalciferol, vitamin D3, 5,000 unit Tab, Take 125 mcg by mouth., Disp: , Rfl:   ?  clopidogreL (PLAVIX) 75 mg tablet, TAKE 1 TABLET BY MOUTH EVERY DAY, Disp: 90 tablet, Rfl: 0  ?  nitroglycerin (NITROSTAT) 0.4 MG SL tablet, Place 1 tablet (0.4 mg total) under the tongue every 5 (five) minutes as needed for chest pain., Disp: 20 tablet, Rfl: 4    Current Facility-Administered Medications:   ?  Study Drug evolocumab 140 mg/mL injector pen 1 Syringe (), 1 Syringe, Subcutaneous, Q14 Days, Dinesh Lira, RN    Dinesh Lira, RN  Clinical Trials Nurse

## 2021-08-06 NOTE — PROGRESS NOTES
"Progress Notes by Dinesh Lira, RN at 11/10/2020 11:30 AM     Author: Dinesh Lira, RN Service: -- Author Type: Registered Nurse    Filed: 11/10/2020  1:21 PM Encounter Date: 11/10/2020 Status: Signed    : Dinesh Lira, RN (Registered Nurse)           Further Cardiovascular Outcomes Research With PCSK9 Inhibition in Subjects With  Elevated Risk Open-label Extension: FOURIER OLE study    Subject seen in clinic today for Week 216 study visit.    Vitals:    11/10/20 1136   BP: 120/60   Patient Site: Right Arm   Patient Position: Sitting   Cuff Size: Adult Regular   Pulse: (!) 56   Resp: 20   Weight: 189 lb 14.4 oz (86.1 kg)   Height: 5' 9\" (1.753 m)      Labs drawn per protocol      Subject returned Used pens & boxes   Used pens #3 [id number]   Used pens #3 [id number]    Subject is not due for an injection in clinic today.    Sent home with new IP   1 box 3 pens of box [id number]   1 box 3 pens of box [id number]     Subject reports no adverse events, endpoints, or significant changes in health.     Plan: We will see subject back for Week 240 study visit in 6 months.  We have a study drug resupply visit in 3 months.     Current Outpatient Medications:   ?  amLODIPine (NORVASC) 2.5 MG tablet, TAKE 1 TABLET BY MOUTH EVERY DAY, Disp: 90 tablet, Rfl: 1  ?  atorvastatin (LIPITOR) 10 MG tablet, TAKE 1/2 TABLET BY MOUTH NIGHTLY AT BEDTIME (Patient taking differently: TAKE 1/2 TABLET BY MOUTH EVERY OTHER NIGHT AT BEDTIME), Disp: 45 tablet, Rfl: 1  ?  cholecalciferol, vitamin D3, 5,000 unit Tab, Take 125 mcg by mouth., Disp: , Rfl:   ?  clopidogreL (PLAVIX) 75 mg tablet, TAKE 1 TABLET BY MOUTH EVERY DAY, Disp: 90 tablet, Rfl: 1  ?  nitroglycerin (NITROSTAT) 0.4 MG SL tablet, Place 1 tablet (0.4 mg total) under the tongue every 5 (five) minutes as needed for chest pain., Disp: 20 tablet, Rfl: 4    Current Facility-Administered Medications:   ?  Study Drug evolocumab 140 mg/mL injector pen 1 Syringe (), 1 " Syringe, Subcutaneous, Q14 Days, Dinesh Lira, RN    Dinesh Lira, RN  Clinical Trials Nurse

## 2021-08-06 NOTE — PROGRESS NOTES
"Progress Notes by Dinesh Lira, RN at 8/14/2017  7:30 AM     Author: Dinesh Lira, RN Service: -- Author Type: Registered Nurse    Filed: 8/14/2017  1:52 PM Encounter Date: 8/14/2017 Status: Signed    : Dinesh Lira RN (Registered Nurse)           Further Cardiovascular Outcomes Research With PCSK9 Inhibition in Subjects With  Elevated Risk Open-label Extension: FOURIER OLE study     Subject seen in clinic for Week 48 study visit    Vitals:    08/14/17 0733   BP: 112/64   Patient Site: Left Arm   Patient Position: Sitting   Cuff Size: Adult Large   Pulse: 60   Resp: 18   Temp: 98  F (36.7  C)   TempSrc: Oral   Weight: 187 lb (84.8 kg)   Height: 5' 8\" (1.727 m)     Labs drawn per protocol.     Subject returned Used pens & boxes   Used pens #2 [id number]   Used pens #3 [id number]     Subject is not due for an injection in clinic today.    Sent home with new IP   1 box 3 pens of box [id number]   1 box 3 pens of box [id number]    Subject reports no adverse events, endpoints, or significant changes in health.     Plan: We will see subject back for week 72 study visit in 6 months.  We have a study drug resupply visit in 3 months     Current Outpatient Prescriptions:   ?  amLODIPine (NORVASC) 2.5 MG tablet, Take 1 tablet (2.5 mg total) by mouth at bedtime., Disp: 90 tablet, Rfl: 2  ?  atorvastatin (LIPITOR) 20 MG tablet, Take 0.5 tablets (10 mg total) by mouth bedtime., Disp: 90 tablet, Rfl: 3  ?  clopidogrel (PLAVIX) 75 mg tablet, TAKE ONE TABLET BY MOUTH ONE TIME DAILY, Disp: 90 tablet, Rfl: 1  ?  diazepam (VALIUM) 2 MG tablet, Take 2 mg by mouth daily as needed for anxiety. , Disp: , Rfl:   ?  metoprolol succinate (TOPROL-XL) 25 MG, Take 1 tablet (25 mg total) by mouth daily., Disp: 90 tablet, Rfl: 2  ?  Study Drug evolocumab 140 mg/mL (), Inject 1 Syringe under the skin every 14 (fourteen) days., Disp: , Rfl:   ?  aspirin 81 MG EC tablet, Take 81 mg by mouth every evening. , Disp: , Rfl:   ?  " isosorbide mononitrate (IMDUR) 30 MG 24 hr tablet, TAKE ONE TABLET BY MOUTH ONE TIME DAILY, Disp: 90 tablet, Rfl: 2    Current Facility-Administered Medications:   ?  Study Drug evolocumab 140 mg/mL injector pen 1 Syringe (), 1 Syringe, Subcutaneous, Q14 Days, Rita Irizarry MD, 1 Syringe at 12/12/16 0818  ?  Study Drug evolocumab 140 mg/mL injector pen 1 Syringe (), 1 Syringe, Subcutaneous, Q14 Days, Rita Irizarry MD, 1 Syringe at 03/06/17 0920  ?  Study Drug evolocumab 140 mg/mL injector pen 1 Syringe (), 1 Syringe, Subcutaneous, Q14 Days, Rita Irizarry MD  ?  Study Drug evolocumab 140 mg/mL injector pen 1 Syringe (), 1 Syringe, Subcutaneous, Q14 Days, Dinesh Lira, RN  ?  Study Drug Evolocumab 140 mg/Placebo Autoinjector pen <FOURIER> 1 pen, 1 pen, Subcutaneous, Q14 Days, Jenna Matamoros RN, 1 pen at 09/15/16 0925    Dinesh Lira, RN  Clinical Trials Nurse

## 2021-08-06 NOTE — PROGRESS NOTES
"Progress Notes by Dinesh Lira, RN at 12/10/2019  7:30 AM     Author: Dinesh Lira, RN Service: -- Author Type: Registered Nurse    Filed: 12/10/2019  9:45 AM Encounter Date: 12/10/2019 Status: Signed    : Dinesh Lira, RN (Registered Nurse)           Further Cardiovascular Outcomes Research With PCSK9 Inhibition in Subjects With  Elevated Risk Open-label Extension: FOURIER OLE study    Subject seen in clinic for Week 168 study visit    Re-consent process:   Research nurse met with subject to discuss reconsent and updates in the above noted study.    The study discussion included the following:     Study purpose    Qualifications for participation    Length of study participation    Study procedures    Risks and side effects    Benefits (if any)    Voluntary nature of participation    Alternatives to participation    Confidentiality of records    Financial considerations     Updates to consent (in brief-full details available in consent from-see Media tab):    Clarification on contact by phone after last dose    Updates to detailed safety information, side effects, and allergic reaction    A/I pen and automated mini-dosier    Clarified updates for pregnancy and sexual partner      Reviewed consent and these changes with Aníbal Espinoza   Questions answered to their satisfaction.  Aníbal Espinoza signed the consent form ([employer] version 4.0, version date 20Sept2019) today.     Signed copy given to him & sent to medical records.& a copy was forwarded to medical records.    Vitals:    12/10/19 0734   BP: 120/72   Patient Site: Right Arm   Patient Position: Sitting   Cuff Size: Adult Regular   Pulse: (!) 56   Resp: 14   Weight: 192 lb (87.1 kg)   Height: 5' 9\" (1.753 m)     Labs drawn per protocol.     Subject returned Used pens & boxes   Used pens #3 [id number]   Used pens #3 [id number]    Subject is not due for an injection in clinic today.    Sent home with new IP   1 box 3 pens of box [id number]   1 " box 3 pens of box [id number]     Subject reports no adverse events, endpoints, or significant changes in health.     Plan: We will see subject back for Week 192 study visit in 6 months.  We have a study drug resupply visit in 3 months.       Current Outpatient Medications:   ?  amLODIPine (NORVASC) 2.5 MG tablet, Take 1 tablet (2.5 mg total) by mouth daily., Disp: 90 tablet, Rfl: 4  ?  atorvastatin (LIPITOR) 10 MG tablet, TAKE 1/2 TABLET BY MOUTH NIGHTLY AT BEDTIME (Patient taking differently: Take 5 mg by mouth every other day. Take 0.5 tablets (5mg total) by mouth every other day), Disp: 45 tablet, Rfl: 1  ?  clopidogrel (PLAVIX) 75 mg tablet, Take 1 tablet (75 mg total) by mouth daily., Disp: 90 tablet, Rfl: 4  ?  nitroglycerin (NITROSTAT) 0.4 MG SL tablet, Place 1 tablet (0.4 mg total) under the tongue every 5 (five) minutes as needed for chest pain., Disp: 20 tablet, Rfl: 4    Current Facility-Administered Medications:   ?  Study Drug evolocumab 140 mg/mL injector pen 1 Syringe (), 1 Syringe, Subcutaneous, Q14 Days, Dinesh Lira, RN    Dinesh Lira, RN  Clinical Trials Nurse

## 2021-08-06 NOTE — PROGRESS NOTES
"Progress Notes by Dinesh Lira, RN at 2/5/2018  8:00 AM     Author: Dinesh Lira, RN Service: -- Author Type: Registered Nurse    Filed: 2/5/2018 11:04 AM Encounter Date: 2/5/2018 Status: Signed    : Dinesh Lira RN (Registered Nurse)           Further Cardiovascular Outcomes Research With PCSK9 Inhibition in Subjects With  Elevated Risk Open-label Extension: FOURIER OLE study     Subject seen in clinic for Week 72 study visit    Vitals:    02/05/18 0751   BP: 128/70   Patient Site: Left Arm   Patient Position: Sitting   Cuff Size: Adult Large   Pulse: 64   Resp: 18   Temp: 98.2  F (36.8  C)   TempSrc: Oral   Weight: 201 lb (91.2 kg)   Height: 5' 9\" (1.753 m)     Labs drawn per protocol.     Subject returned Used pens & boxes   Used pens #3 [id number]   Used pens #3 [id number]     Subject self administered 1 pen of [id number] at 0830 into right thigh with no problem  Administrations This Visit     Study Drug evolocumab 140 mg/mL injector pen 1 Syringe ()     Admin Date Action Dose Route Administered By             02/05/2018 Given 1 Syringe Subcutaneous Dinesh Lira RN                        Sent home with new IP   1 box 3 pens of box [id number]   1 box 2 pens of box [id number]     Subject reports no adverse events, endpoints, or significant changes in health.     Plan: We will see subject back for week 96 study visit in 6 months.  We have a study drug resupply visit in 3 months     Current Outpatient Prescriptions:   ?  amLODIPine (NORVASC) 2.5 MG tablet, Take 1 tablet (2.5 mg total) by mouth at bedtime., Disp: 90 tablet, Rfl: 2  ?  atorvastatin (LIPITOR) 10 MG tablet, Take 0.5 tablets (5 mg total) by mouth at bedtime., Disp: 45 tablet, Rfl: 1  ?  clopidogrel (PLAVIX) 75 mg tablet, TAKE 1 TABLET (75 MG TOTAL) BY MOUTH DAILY., Disp: 90 tablet, Rfl: 1  ?  diazePAM (VALIUM) 2 MG tablet, Take 1 tablet (2 mg total) by mouth daily as needed for anxiety., Disp: 5 tablet, Rfl: 0  ?  isosorbide " mononitrate (IMDUR) 30 MG 24 hr tablet, Take 30 mg by mouth daily. , Disp: , Rfl:   ?  Study Drug evolocumab 140 mg/mL (), Inject 1 Syringe under the skin every 14 (fourteen) days., Disp: , Rfl:     Current Facility-Administered Medications:   ?  Study Drug evolocumab 140 mg/mL injector pen 1 Syringe (), 1 Syringe, Subcutaneous, Q14 Days, Dinesh Lira RN, 1 Syringe at 02/05/18 0829    Dinesh Lira RN  Clinical Trials Nurse

## 2021-08-06 NOTE — PROGRESS NOTES
"Progress Notes by Dinesh Lira, RN at 7/17/2018  7:30 AM     Author: Dinesh Lira, RN Service: -- Author Type: Registered Nurse    Filed: 7/17/2018  3:40 PM Encounter Date: 7/17/2018 Status: Signed    : Dinesh Lira, RN (Registered Nurse)           Further Cardiovascular Outcomes Research With PCSK9 Inhibition in Subjects With  Elevated Risk Open-label Extension: FOURIER OLE study     Subject seen in clinic for Week 96 study visit    Updates to consent (in brief-full details available in consent from-see Media tab):    Clarification on staying in the study while no longer taking open label study drug    Information regarding use of public records for vital status if a subject does completely withdraw from the study    Updated end of study visits to include a phone call from the site ~30 days after last dose of open label study drug    Risk section updated with side effects:    Runny nose, back pain, nose & throat infections (URI), nausea, rash, influenza, arthralgia, & injection site reaction(s)    Noted hives occur ~ 1-10 in every 1000    Added information about very low cholesterol levels    Added details regarding testing for antibodies    Clarified risks with auto injector pen pertaining latex in rubber stopper    Added instructions to notify staff if a female subject becomes pregnant or if a male subject's partner becomes pregnant    Reviewed these changes with subject.  Subject signed the consent form (version 03, [id number]) today.   A copy given to them & sent to medical records.      Vitals:    07/17/18 0733   BP: 132/68   Patient Site: Left Arm   Patient Position: Sitting   Cuff Size: Adult Large   Pulse: (!) 56   Resp: 14   Weight: 215 lb (97.5 kg)   Height: 5' 9\" (1.753 m)     Labs drawn per protocol.     Subject returned Used pens & boxes   Used pens #3 [id number]   Used pens #3 [id number]     Subject is not due for an injection in clinic today.    Sent home with new IP   1 box 3 pens " of box [id number]   1 box 3 pens of box [id number]      Subject reports no adverse events, endpoints, or significant changes in health.     Plan: We will see subject back for week 120 study visit in 6 months.  We have a study drug resupply visit in 3 months       Current Outpatient Prescriptions:   ?  amLODIPine (NORVASC) 2.5 MG tablet, TAKE 1 TABLET (2.5 MG TOTAL) BY MOUTH AT BEDTIME., Disp: 90 tablet, Rfl: 2  ?  atorvastatin (LIPITOR) 10 MG tablet, Take 0.5 tablets (5 mg total) by mouth at bedtime. (Patient taking differently: Take 5 mg by mouth at bedtime. Take 1/2 tablet (5mg) every other day), Disp: 45 tablet, Rfl: 1  ?  clopidogrel (PLAVIX) 75 mg tablet, TAKE 1 TABLET (75 MG TOTAL) BY MOUTH DAILY., Disp: 90 tablet, Rfl: 1  ?  diazePAM (VALIUM) 2 MG tablet, Take 1 tablet (2 mg total) by mouth daily as needed for anxiety., Disp: 5 tablet, Rfl: 0  ?  Study Drug evolocumab 140 mg/mL (), Inject 1 Syringe under the skin every 14 (fourteen) days., Disp: , Rfl:   ?  isosorbide mononitrate (IMDUR) 30 MG 24 hr tablet, Take 30 mg by mouth daily. , Disp: , Rfl:     Current Facility-Administered Medications:   ?  Study Drug evolocumab 140 mg/mL injector pen 1 Syringe (), 1 Syringe, Subcutaneous, Q14 Days, Dinesh Lira, RN    Dinesh Lira, RN  Clinical Trials Nurse

## 2021-08-06 NOTE — PROGRESS NOTES
"Progress Notes by Dinesh Lira, RN at 6/25/2019  7:30 AM     Author: Dinesh Lira, RN Service: -- Author Type: Registered Nurse    Filed: 6/25/2019 10:19 AM Encounter Date: 6/25/2019 Status: Signed    : Dinesh Lira RN (Registered Nurse)           Further Cardiovascular Outcomes Research With PCSK9 Inhibition in Subjects With  Elevated Risk Open-label Extension: FOURIER OLE study    Subject seen in clinic for Week 144 study visit    Vitals:    06/25/19 0730   BP: 126/70   Patient Site: Left Arm   Patient Position: Sitting   Cuff Size: Adult Regular   Pulse: 66   Resp: 14   Weight: 191 lb (86.6 kg)   Height: 5' 9\" (1.753 m)     Labs drawn per protocol.     Subject returned Used pens & boxes   Used pens #3 [id number]   Used pens #3 [id number]    Subject is not due for an injection in clinic today.    Sent home with new IP   1 box 3 pens of box [id number]   1 box 3 pens of box [id number]     Subject reports  no adverse events, endpoints, or significant changes in health.     Plan: We will see subject back for Week 168 study visit in 6 months.  We have a study drug resupply visit in 3 months.       Current Outpatient Medications:   ?  amLODIPine (NORVASC) 2.5 MG tablet, Take 1 tablet (2.5 mg total) by mouth daily., Disp: 90 tablet, Rfl: 4  ?  atorvastatin (LIPITOR) 10 MG tablet, Take 0.5 tablets (5 mg total) by mouth every other day., Disp: 45 tablet, Rfl: 4  ?  clopidogrel (PLAVIX) 75 mg tablet, Take 1 tablet (75 mg total) by mouth daily., Disp: 90 tablet, Rfl: 4  ?  nitroglycerin (NITROSTAT) 0.4 MG SL tablet, Place 1 tablet (0.4 mg total) under the tongue every 5 (five) minutes as needed for chest pain., Disp: 20 tablet, Rfl: 4    Current Facility-Administered Medications:   ?  Study Drug evolocumab 140 mg/mL injector pen 1 Syringe (), 1 Syringe, Subcutaneous, Q14 Days, Dinesh Lira, RN    Dinesh Lira RN  Clinical Trials Nurse           "

## 2021-08-18 ENCOUNTER — OFFICE VISIT (OUTPATIENT)
Dept: CARDIOLOGY | Facility: CLINIC | Age: 56
End: 2021-08-18

## 2021-08-18 VITALS
HEART RATE: 52 BPM | RESPIRATION RATE: 16 BRPM | DIASTOLIC BLOOD PRESSURE: 64 MMHG | SYSTOLIC BLOOD PRESSURE: 106 MMHG | BODY MASS INDEX: 26.85 KG/M2 | WEIGHT: 181.8 LBS

## 2021-08-18 DIAGNOSIS — I25.84 CORONARY ARTERY DISEASE DUE TO CALCIFIED CORONARY LESION: Primary | Chronic | ICD-10-CM

## 2021-08-18 DIAGNOSIS — I25.10 CORONARY ARTERY DISEASE DUE TO CALCIFIED CORONARY LESION: Primary | Chronic | ICD-10-CM

## 2021-08-18 DIAGNOSIS — E78.00 PURE HYPERCHOLESTEROLEMIA: ICD-10-CM

## 2021-08-18 DIAGNOSIS — E78.5 HYPERLIPIDEMIA LDL GOAL <70: Primary | ICD-10-CM

## 2021-08-18 DIAGNOSIS — I10 ESSENTIAL HYPERTENSION: Chronic | ICD-10-CM

## 2021-08-18 DIAGNOSIS — Z95.1 S/P CABG (CORONARY ARTERY BYPASS GRAFT): ICD-10-CM

## 2021-08-18 PROCEDURE — 99214 OFFICE O/P EST MOD 30 MIN: CPT | Performed by: INTERNAL MEDICINE

## 2021-08-18 PROCEDURE — 99207 PR NO CHARGE-RESEARCH SERVICE: CPT

## 2021-08-18 RX ORDER — ATORVASTATIN CALCIUM 10 MG/1
10 TABLET, FILM COATED ORAL EVERY OTHER DAY
Qty: 90 TABLET | Refills: 4 | Status: SHIPPED | OUTPATIENT
Start: 2021-08-18

## 2021-08-18 NOTE — LETTER
8/18/2021    Physician No Ref-Primary  No address on file    RE: Aníbal Espinoza       Dear Colleague,    I had the pleasure of seeing Aníbal Espinoza in the [facility].    Further Cardiovascular Outcomes Research With PCSK9 Inhibition in Subjects With Elevated Risk Open-label Extension: FOURIER OLE study    Subject seen in clinic today for Week 260 study visit.    Labs drawn per protocol      Subject returned Used pens & boxes   Used pens #2, unused#1 [id number],    Unused pens #3 [id number]  Had unused pens per early visit due to subject [place] out of [place] next [place] so we are a few weeks early in the visit window.    Subject reports no adverse events, endpoints, or significant changes in health.     Plan: This is the final study visit, thanks subject for their participation and discussed continuation of care details.  Subject is moving to [place] and eddie seek care there.  Dr. Irizarry is aware of plan for lipid lowering therapy after study.      Current Outpatient Medications:      amLODIPine (NORVASC) 2.5 MG tablet, [AMLODIPINE (NORVASC) 2.5 MG TABLET] TAKE 1 TABLET BY MOUTH EVERY DAY, Disp: 90 tablet, Rfl: 1     atorvastatin (LIPITOR) 10 MG tablet, [ATORVASTATIN (LIPITOR) 10 MG TABLET] TAKE 1/2 TABLET BY MOUTH NIGHTLY AT BEDTIME (Patient taking differently: Take 10 mg by mouth every other day ), Disp: 45 tablet, Rfl: 1     cholecalciferol, vitamin D3, 5,000 unit Tab, [CHOLECALCIFEROL, VITAMIN D3, 5,000 UNIT TAB] Take 125 mcg by mouth daily. , Disp: , Rfl:      clopidogrel (PLAVIX) 75 MG tablet, [CLOPIDOGREL (PLAVIX) 75 MG TABLET] TAKE 1 TABLET BY MOUTH EVERY DAY, Disp: 90 tablet, Rfl: 3     nitroglycerin (NITROSTAT) 0.4 MG SL tablet, [NITROGLYCERIN (NITROSTAT) 0.4 MG SL TABLET] Place 1 tablet (0.4 mg total) under the tongue every 5 (five) minutes as needed for chest pain., Disp: 20 tablet, Rfl: 4     UNABLE TO FIND, Elderberry, Disp: , Rfl:     Research  Hotline: [phone]    Dinesh Lira RN  Clinical Trials Nurse      Thank you for allowing me to participate in the care of your patient.      Sincerely,     Dinesh Lira RN     [facility]  cc:   No referring provider defined for this encounter.

## 2021-08-18 NOTE — PROGRESS NOTES
Further Cardiovascular Outcomes Research With PCSK9 Inhibition in Subjects With Elevated Risk Open-label Extension: FOURIER OLE study    Subject seen in clinic today for Week 260 study visit.    Labs drawn per protocol      Subject returned Used pens & boxes   Used pens #2, unused#1 [id number],    Unused pens #3 [id number]  Had unused pens per early visit due to subject moving out of state next week so we are a few weeks early in the visit window.    Subject reports no adverse events, endpoints, or significant changes in health.     Plan: This is the final study visit, thanks subject for their participation and discussed continuation of care details.  Subject is moving to [place] and eddie seek care there.  Dr. Irizarry is aware of plan for lipid lowering therapy after study.      Current Outpatient Medications:      amLODIPine (NORVASC) 2.5 MG tablet, [AMLODIPINE (NORVASC) 2.5 MG TABLET] TAKE 1 TABLET BY MOUTH EVERY DAY, Disp: 90 tablet, Rfl: 1     atorvastatin (LIPITOR) 10 MG tablet, [ATORVASTATIN (LIPITOR) 10 MG TABLET] TAKE 1/2 TABLET BY MOUTH NIGHTLY AT BEDTIME (Patient taking differently: Take 10 mg by mouth every other day ), Disp: 45 tablet, Rfl: 1     cholecalciferol, vitamin D3, 5,000 unit Tab, [CHOLECALCIFEROL, VITAMIN D3, 5,000 UNIT TAB] Take 125 mcg by mouth daily. , Disp: , Rfl:      clopidogrel (PLAVIX) 75 MG tablet, [CLOPIDOGREL (PLAVIX) 75 MG TABLET] TAKE 1 TABLET BY MOUTH EVERY DAY, Disp: 90 tablet, Rfl: 3     nitroglycerin (NITROSTAT) 0.4 MG SL tablet, [NITROGLYCERIN (NITROSTAT) 0.4 MG SL TABLET] Place 1 tablet (0.4 mg total) under the tongue every 5 (five) minutes as needed for chest pain., Disp: 20 tablet, Rfl: 4     UNABLE TO FIND, Elderberry, Disp: , Rfl:     Research Hotline: [phone]    Dinesh Lira RN  Clinical Trials Nurse

## 2021-08-18 NOTE — PATIENT INSTRUCTIONS
ReaganBEBE Espinoza,  I enjoyed visiting with you again today.  I am glad to hear you are doing well.  Per our conversation try taking the atorvastatin 10 mg every other day.  Good luck with the move.  Sony Irizarry

## 2021-08-18 NOTE — PROGRESS NOTES
[facility]  Heart Care Clinic Follow-up Note    Assessment & Plan        (I25.10,  I25.84) Coronary artery disease due to calcified coronary lesion  (primary encounter diagnosis)-angiography August 2015 showed 20% mid left main lesion, proximal total occlusion of the LAD, circumflex with mid lesion total occlusion, and right coronary artery with drug-coated stents to the right coronary artery.  Chronic Plavix therapy, preventive therapy, and he is asymptomatic.  If he has increased symptoms discuss  procedure to the circumflex.  Angiography was prompted by non-Q wave MI in 2009.    (E78.00) Pure hypercholesterolemia-he was in the open label extension of the Fourier trial and his total cholesterol is 74 with an LDL of 25 and being rechecked today.    Study is ending, he has been on atorvastatin 5 mg every other day and will titrate up to 10 mg every other day with goal of reaching 80 mg a day.  We will consider him for [place] study.    (Z95.1) S/P CABG (coronary artery bypass graft)--had bypass in July 2009 with a LIMA to the LAD, vein graft and OM 3, and both of these are patent.    Given progression of disease despite bypass he is on chronic Plavix therapy.  Given that his bypass was over 10 years ago will arrange for stress testing every other year, however he would like to wait till the mask mandate is off and once mask mandate is released need stress test.    (I10) Essential hypertension-under good control and if anything a little low with amlodipine 2.5 mg a day.    Plan  1.  Patient will be leaving [facility] and moving to [place], will fill atorvastatin at 10 mg every other day for now.    Subjective  CC: 55-year-old gentleman who I first saw in 2009 due to non-ST segment elevation MI.  He had angiography at that time and received coronary bypass grafting and subsequently received stents to the right coronary artery.  He is getting ready to retire and move to [place].  He gets along well. Patient complains  of no syncope, dizziness, fatigue, fevers, chest pain, palpitations, shortness of breath, PND, orthopnea, nausea, vomiting, or edema.    Medications  Current Outpatient Medications   Medication Sig Dispense Refill     amLODIPine (NORVASC) 2.5 MG tablet [AMLODIPINE (NORVASC) 2.5 MG TABLET] TAKE 1 TABLET BY MOUTH EVERY DAY 90 tablet 1     atorvastatin (LIPITOR) 10 MG tablet Take 1 tablet (10 mg) by mouth every other day 90 tablet 4     cholecalciferol, vitamin D3, 5,000 unit Tab [CHOLECALCIFEROL, VITAMIN D3, 5,000 UNIT TAB] Take 125 mcg by mouth daily.        clopidogrel (PLAVIX) 75 MG tablet [CLOPIDOGREL (PLAVIX) 75 MG TABLET] TAKE 1 TABLET BY MOUTH EVERY DAY 90 tablet 3     nitroglycerin (NITROSTAT) 0.4 MG SL tablet [NITROGLYCERIN (NITROSTAT) 0.4 MG SL TABLET] Place 1 tablet (0.4 mg total) under the tongue every 5 (five) minutes as needed for chest pain. 20 tablet 4     UNABLE TO FIND Elderberry         Objective  /64 (BP Location: Right arm, Patient Position: Sitting, Cuff Size: Adult Regular)   Pulse 52   Resp 16   Wt 82.5 kg (181 lb 12.8 oz)   BMI 26.85 kg/m      General Appearance:    Alert, cooperative, no distress, appears stated age   Head:    Normocephalic, without obvious abnormality, atraumatic   Throat:   Lips, mucosa, and tongue normal; teeth and gums normal   Neck:   Supple, symmetrical, trachea midline, no adenopathy;        thyroid:  No enlargement/tenderness/nodules; no carotid    bruit or JVD   Back:     Symmetric, no curvature, ROM normal, no CVA tenderness   Lungs:     Clear to auscultation bilaterally, respirations unlabored   Chest wall:    No tenderness, midline sternotomy scar   Heart:    Regular rate and rhythm, S1 and S2 normal, no murmur, rub   or gallop   Abdomen:     Soft, non-tender, bowel sounds active all four quadrants,     no masses, no organomegaly   Extremities:   Normal, atraumatic, no cyanosis or edema   Pulses:   2+ and symmetric all extremities   Skin:   Skin color,  texture, turgor normal, no rashes or lesions     Results    Lab Results personally reviewed   Lab Results   Component Value Date    CHOL 144 01/17/2014    CHOL 167 07/08/2009     Lab Results   Component Value Date    HDL 29 (L) 01/17/2014    HDL 24 (L) 07/08/2009     No components found for: LDLCALC  Lab Results   Component Value Date    TRIG 105 01/17/2014    TRIG 175 (H) 07/08/2009     No results found for: WBC, HGB, HCT, PLT  No results found for: CREATININE, BUN, NA, CO2  Review of Systems:   Enc Vitals  BP: 106/64  Pulse: 52  Resp: 16  Weight: 82.5 kg (181 lb 12.8 oz)

## 2021-08-18 NOTE — LETTER
8/18/2021    Physician No Ref-Primary  No address on file    RE: Aníbal Espinoza       Dear Colleague,    I had the pleasure of seeing Aníbal Espinoza in the [facility].        [facility]  Heart Care Clinic Follow-up Note    Assessment & Plan        (I25.10,  I25.84) Coronary artery disease due to calcified coronary lesion  (primary encounter diagnosis)-angiography August 2015 showed 20% mid left main lesion, proximal total occlusion of the LAD, circumflex with mid lesion total occlusion, and right coronary artery with drug-coated stents to the right coronary artery.  Chronic Plavix therapy, preventive therapy, and he is asymptomatic.  If he has increased symptoms discuss  procedure to the circumflex.  Angiography was prompted by non-Q wave MI in 2009.    (E78.00) Pure hypercholesterolemia-he was in the open label extension of the Fourier trial and his total cholesterol is 74 with an LDL of 25 and being rechecked today.    Study is ending, he has been on atorvastatin 5 mg every other day and will titrate up to 10 mg every other day with goal of reaching 80 mg a day.  We will consider him for [place] study.    (Z95.1) S/P CABG (coronary artery bypass graft)--had bypass in July 2009 with a LIMA to the LAD, vein graft and OM 3, and both of these are patent.    Given progression of disease despite bypass he is on chronic Plavix therapy.  Given that his bypass was over 10 years ago will arrange for stress testing every other year, however he would like to wait till the mask mandate is off and once mask mandate is released need stress test.    (I10) Essential hypertension-under good control and if anything a little low with amlodipine 2.5 mg a day.    Plan  1.  [facility] to [place], will fill atorvastatin at 10 mg every other day for now.    Subjective  CC: 55-year-old gentleman who I first saw in 2009 due to non-ST segment  elevation MI.  He had angiography at that time and received coronary bypass grafting and subsequently received stents to the right coronary artery.  He is getting ready to retire and move to [place].  He gets along well. Patient complains of no syncope, dizziness, fatigue, fevers, chest pain, palpitations, shortness of breath, PND, orthopnea, nausea, vomiting, or edema.    Medications  Current Outpatient Medications   Medication Sig Dispense Refill     amLODIPine (NORVASC) 2.5 MG tablet [AMLODIPINE (NORVASC) 2.5 MG TABLET] TAKE 1 TABLET BY MOUTH EVERY DAY 90 tablet 1     atorvastatin (LIPITOR) 10 MG tablet Take 1 tablet (10 mg) by mouth every other day 90 tablet 4     cholecalciferol, vitamin D3, 5,000 unit Tab [CHOLECALCIFEROL, VITAMIN D3, 5,000 UNIT TAB] Take 125 mcg by mouth daily.        clopidogrel (PLAVIX) 75 MG tablet [CLOPIDOGREL (PLAVIX) 75 MG TABLET] TAKE 1 TABLET BY MOUTH EVERY DAY 90 tablet 3     nitroglycerin (NITROSTAT) 0.4 MG SL tablet [NITROGLYCERIN (NITROSTAT) 0.4 MG SL TABLET] Place 1 tablet (0.4 mg total) under the tongue every 5 (five) minutes as needed for chest pain. 20 tablet 4     UNABLE TO FIND [place]         Objective  /64 (BP Location: Right arm, Patient Position: Sitting, Cuff Size: Adult Regular)   Pulse 52   Resp 16   Wt 82.5 kg (181 lb 12.8 oz)   BMI 26.85 kg/m      General Appearance:    Alert, cooperative, no distress, appears stated age   Head:    Normocephalic, without obvious abnormality, atraumatic   Throat:   Lips, mucosa, and tongue normal; teeth and gums normal   Neck:   Supple, symmetrical, trachea midline, no adenopathy;        thyroid:  No enlargement/tenderness/nodules; no carotid    bruit or JVD   Back:     Symmetric, no curvature, ROM normal, no CVA tenderness   Lungs:     Clear to auscultation bilaterally, respirations unlabored   Chest wall:    No tenderness, midline sternotomy scar   Heart:    Regular rate and rhythm, S1 and S2 normal, no murmur, rub   or  gallop   Abdomen:     Soft, non-tender, bowel sounds active all four quadrants,     no masses, no organomegaly   Extremities:   Normal, atraumatic, no cyanosis or edema   Pulses:   2+ and symmetric all extremities   Skin:   Skin color, texture, turgor normal, no rashes or lesions     Results    Lab Results personally reviewed   Lab Results   Component Value Date    CHOL 144 01/17/2014    CHOL 167 07/08/2009     Lab Results   Component Value Date    HDL 29 (L) 01/17/2014    HDL 24 (L) 07/08/2009     No components found for: LDLCALC  Lab Results   Component Value Date    TRIG 105 01/17/2014    TRIG 175 (H) 07/08/2009     No results found for: WBC, HGB, HCT, PLT  No results found for: CREATININE, BUN, NA, CO2  Review of Systems:   Enc Vitals  BP: 106/64  Pulse: 52  Resp: 16  Weight: 82.5 kg (181 lb 12.8 oz)                                              Thank you for allowing me to participate in the care of your patient.      Sincerely,     CHRIS WILKINSON MD     [facility]  cc:   No referring provider defined for this encounter.

## 2021-08-19 ENCOUNTER — TRANSFERRED RECORDS (OUTPATIENT)
Dept: CARDIOLOGY | Facility: CLINIC | Age: 56
End: 2021-08-19

## 2021-09-29 ENCOUNTER — TELEPHONE (OUTPATIENT)
Dept: CARDIOLOGY | Facility: CLINIC | Age: 56
End: 2021-09-29

## 2021-09-30 ENCOUNTER — DOCUMENTATION ONLY (OUTPATIENT)
Dept: CARDIOLOGY | Facility: CLINIC | Age: 56
End: 2021-09-30

## 2021-09-30 PROCEDURE — 99207 PR NO CHARGE-RESEARCH SERVICE: CPT

## 2021-09-30 NOTE — PROGRESS NOTES
Further Cardiovascular Outcomes Research With PCSK9 Inhibition in Subjects With  Elevated Risk Open-label Extension: FOURIER OLE study    Subject called  Yesterday and emailed for End of study study call (30 days follow up call).    Subject replied to email and per chart review no changes.    Subject reports no new adverse events, endpoints, or significant changes in health.     Last study drug dose on 8/18/21      Current Outpatient Medications:      amLODIPine (NORVASC) 2.5 MG tablet, [AMLODIPINE (NORVASC) 2.5 MG TABLET] TAKE 1 TABLET BY MOUTH EVERY DAY, Disp: 90 tablet, Rfl: 1     atorvastatin (LIPITOR) 10 MG tablet, Take 1 tablet (10 mg) by mouth every other day, Disp: 90 tablet, Rfl: 4     cholecalciferol, vitamin D3, 5,000 unit Tab, [CHOLECALCIFEROL, VITAMIN D3, 5,000 UNIT TAB] Take 125 mcg by mouth daily. , Disp: , Rfl:      clopidogrel (PLAVIX) 75 MG tablet, [CLOPIDOGREL (PLAVIX) 75 MG TABLET] TAKE 1 TABLET BY MOUTH EVERY DAY, Disp: 90 tablet, Rfl: 3     nitroglycerin (NITROSTAT) 0.4 MG SL tablet, [NITROGLYCERIN (NITROSTAT) 0.4 MG SL TABLET] Place 1 tablet (0.4 mg total) under the tongue every 5 (five) minutes as needed for chest pain., Disp: 20 tablet, Rfl: 4     UNABLE TO FIND, [place], Disp: , Rfl:        Dinesh Lira, RN  Clinical Research Nurse  [phone]

## 2022-01-25 DIAGNOSIS — I25.10 CAD (CORONARY ARTERY DISEASE): ICD-10-CM

## 2022-01-25 RX ORDER — AMLODIPINE BESYLATE 2.5 MG/1
2.5 TABLET ORAL DAILY
Qty: 90 TABLET | Refills: 1 | Status: SHIPPED | OUTPATIENT
Start: 2022-01-25 | End: 2022-07-27

## 2022-07-27 DIAGNOSIS — I25.10 CAD (CORONARY ARTERY DISEASE): ICD-10-CM

## 2022-07-27 RX ORDER — AMLODIPINE BESYLATE 2.5 MG/1
TABLET ORAL
Qty: 90 TABLET | Refills: 1 | Status: SHIPPED | OUTPATIENT
Start: 2022-07-27